# Patient Record
Sex: FEMALE | Race: WHITE | NOT HISPANIC OR LATINO | URBAN - METROPOLITAN AREA
[De-identification: names, ages, dates, MRNs, and addresses within clinical notes are randomized per-mention and may not be internally consistent; named-entity substitution may affect disease eponyms.]

---

## 2017-01-04 ENCOUNTER — TRANSFERRED RECORDS (OUTPATIENT)
Dept: HEALTH INFORMATION MANAGEMENT | Facility: CLINIC | Age: 57
End: 2017-01-04

## 2017-02-08 ENCOUNTER — TELEPHONE (OUTPATIENT)
Dept: FAMILY MEDICINE | Facility: CLINIC | Age: 57
End: 2017-02-08

## 2017-02-08 ENCOUNTER — TELEPHONE (OUTPATIENT)
Dept: INTERNAL MEDICINE | Facility: CLINIC | Age: 57
End: 2017-02-08

## 2017-02-08 NOTE — Clinical Note
[facility]  [address] [facility] [address] [phone]          February 8, 2017    Cheyanne Guardado  [address] [place] [address]    Dear Cheyanne,          Our records indicate that you have not scheduled for a(n)appointment with  Antwon Almazan MD and fasting bloodwork which was recommended by your health care team. Monitoring and managing your preventative and chronic health conditions are very important to us.       If you have received your health care elsewhere, please provide us with that information so it can be documented in your chart.    Please call [phone] or message us through your [employer] account to schedule an appointment or provide information for your chart.     I look forward to seeing you and working with you on your health care needs.     Sincerely,       Antwon Almazan MD/              *If you have already scheduled an appointment, please disregard this reminder

## 2017-02-08 NOTE — TELEPHONE ENCOUNTER
Panel Management Review      Patient has the following on her problem list:     Hypertension   Last three blood pressure readings:  BP Readings from Last 3 Encounters:   09/15/16 116/94   08/03/16 110/84   07/19/16 136/109     Blood pressure: Failed    HTN Guidelines:  Age 18-59 BP range:  Less than 140/90  Age 60-85 with Diabetes:  Less than 140/90  Age 60-85 without Diabetes:  less than 150/90      Composite cancer screening  Chart review shows that this patient is due/due soon for the following Colonoscopy  Summary:    Patient is due/failing the following:   BP, Hep C, COLONOSCOPY, Lipids, ASA and STATIN    Action needed:   Routed to provider for review.    Type of outreach:    None, routed to provider for review.    Questions for provider review:    Patient saw Karley in Sept for URI issues and abdominal issues in August. Looks like you are primary and refilled HCTZ in September 2016. Patient failed panel management due to Hypertension and there is no ASA or Statin addressed on Med list. Please advise.                                                                                                                                     Ria Crespo CMA       Chart routed to Provider .

## 2017-02-09 NOTE — TELEPHONE ENCOUNTER
Not sure what this is regarding. You had initiated a telephone encounter stating you needed to know the department and diagnosis. Did you receive a letter or fax asking for this?    Makenzie Farfan,

## 2017-02-13 NOTE — TELEPHONE ENCOUNTER
Per verbal order Dr. Antwon Almazan;  Ok external referral to [facility] for Bariatric surgery referral for consult for weight loss surgery.      :;  Please call patient and let her know bariatric consult referral in system and help patient obtain this referral.  Madelaine Kong RN  I agree with the above plan  Antwon Almazan MD

## 2017-02-13 NOTE — TELEPHONE ENCOUNTER
Dr. Antwon Almazan states had received a referral request from Cheyanne.  Please clarify what she needs or if she is still needing this.  He thinks it was for the [facility] but would need to know what department and what the diagnosis is for the referral.  Madelaine Kong RN

## 2017-02-13 NOTE — TELEPHONE ENCOUNTER
----- Message -----         From: Cheyanne Guardado        Sent: 2/7/2017   9:13 PM          To: An Referral Pool     Subject: Referral Request                                       Cheyanne Guardado would like to request a referral.     Reason: Inquiring about weight loss surgery      Requested provider: [facility] Shailesh     Comment:     Dr. Almazan please refer me to the [place]. My insurance does apply .I just need a referral from Primary DrTuyet       thank you

## 2017-04-13 ENCOUNTER — TELEPHONE (OUTPATIENT)
Dept: INTERNAL MEDICINE | Facility: CLINIC | Age: 57
End: 2017-04-13

## 2017-04-13 DIAGNOSIS — Z12.11 SCREENING FOR COLON CANCER: Primary | ICD-10-CM

## 2017-04-13 NOTE — TELEPHONE ENCOUNTER
Letter or MyChart message sent to remind patient of cancer screenings which are due. Please address orders and close encounter.    Ria Crespo CMA

## 2017-04-26 ENCOUNTER — OFFICE VISIT (OUTPATIENT)
Dept: PSYCHOLOGY | Facility: CLINIC | Age: 57
End: 2017-04-26

## 2017-04-26 DIAGNOSIS — E66.01 PSYCHOLOGICAL FACTORS AFFECTING MORBID OBESITY (H): ICD-10-CM

## 2017-04-26 DIAGNOSIS — F33.0 MAJOR DEPRESSIVE DISORDER, RECURRENT, MILD (H): Primary | ICD-10-CM

## 2017-04-26 DIAGNOSIS — F54 PSYCHOLOGICAL FACTORS AFFECTING MORBID OBESITY (H): ICD-10-CM

## 2017-04-26 DIAGNOSIS — F10.21 ALCOHOL DEPENDENCE IN SUSTAINED FULL REMISSION (H): ICD-10-CM

## 2017-04-26 NOTE — MR AVS SNAPSHOT
After Visit Summary   4/26/2017    Cheyanne Guardado    MRN: [id number]           Patient Information     Date Of Birth          1960        Visit Information        Provider Department      4/26/2017 4:00 PM Iza Padilla, PhD [facility]        Today's Diagnoses     Major depressive disorder, recurrent, mild (H)    -  1    Psychological factors affecting morbid obesity (H)           Follow-ups after your visit        Your next 10 appointments already scheduled     May 24, 2017  4:00 PM CDT   (Arrive by 3:45 PM)   Return Visit with Iza Padilla, PhD BRIAN   [facility] ([facility])    [address] [facility] [address]  [address] [facility] [address] [phone]              Who to contact     Please call your clinic at [phone] to:    Ask questions about your health    Make or cancel appointments    Discuss your medicines    Learn about your test results    Speak to your doctor   If you have compliments or concerns about an experience at your clinic, or if you wish to file a complaint, please contact [facility] Patient Relations at [phone] or email us at [email or web address]         Additional Information About Your Visit        MyChart Information     [employer] gives you secure access to your electronic health record. If you see a primary care provider, you can also send messages to your care team and make appointments. If you have questions, please call your primary care clinic.  If you do not have a primary care provider, please call [phone] and they will assist you.      [employer] is an electronic gateway that provides easy, online access to your medical records. With [employer], you can request a clinic appointment, read your test results, renew a prescription or communicate with your care team.     To access your existing account, please contact your [facility]  or call [phone] for assistance.        Care EveryWhere ID     This is your Care EveryWhere ID. This could be used by other organizations to access your [place] medical records  [id number]         Blood Pressure from Last 3 Encounters:   04/27/17 131/85   09/15/16 (!) 116/94   08/03/16 110/84    Weight from Last 3 Encounters:   04/27/17 92.9 kg (204 lb 12.8 oz)   09/15/16 89.4 kg (197 lb)   08/03/16 89.8 kg (198 lb)              Today, you had the following     No orders found for display       Primary Care Provider Office Phone # Fax #    Antwon Almazan [phone]       [facility] [address] [facility] [address]        Thank you!     Thank you for choosing [facility]  for your care. Our goal is always to provide you with excellent care. Hearing back from our patients is one way we can continue to improve our services. Please take a few minutes to complete the written survey that you may receive in the mail after your visit with us. Thank you!             Your Updated Medication List - Protect others around you: Learn how to safely use, store and throw away your medicines at www.disposemymeds.org.          This list is accurate as of: 4/26/17 11:59 PM.  Always use your most recent med list.                   Brand Name Dispense Instructions for use    clonazePAM 0.5 MG tablet    klonoPIN         estradiol 10 MCG Tabs vaginal tablet    VAGIFEM    30 tablet    Insert one vaginally nightly for 2 weeks, then reduce to three times weekly       hydrochlorothiazide 25 MG tablet    HYDRODIURIL    90 tablet    Take 1 tablet (25 mg) by mouth daily       omeprazole 40 MG capsule    priLOSEC    30 capsule    Take 1 capsule (40 mg) by mouth daily Take 30-60 minutes before a meal.       VYVANSE 70 MG capsule   Generic drug:  lisdexamfetamine      Take  by mouth every morning.       ZYRTEC ALLERGY PO      Take  by mouth.

## 2017-04-27 ENCOUNTER — OFFICE VISIT (OUTPATIENT)
Dept: FAMILY MEDICINE | Facility: CLINIC | Age: 57
End: 2017-04-27
Payer: COMMERCIAL

## 2017-04-27 VITALS
DIASTOLIC BLOOD PRESSURE: 85 MMHG | TEMPERATURE: 97.2 F | SYSTOLIC BLOOD PRESSURE: 131 MMHG | OXYGEN SATURATION: 100 % | BODY MASS INDEX: 41.36 KG/M2 | WEIGHT: 204.8 LBS | HEART RATE: 93 BPM

## 2017-04-27 DIAGNOSIS — R07.0 THROAT PAIN: Primary | ICD-10-CM

## 2017-04-27 DIAGNOSIS — I10 BENIGN ESSENTIAL HYPERTENSION: ICD-10-CM

## 2017-04-27 DIAGNOSIS — J35.8 TONSILLOLITH: ICD-10-CM

## 2017-04-27 LAB
DEPRECATED S PYO AG THROAT QL EIA: NORMAL
MICRO REPORT STATUS: NORMAL
SPECIMEN SOURCE: NORMAL

## 2017-04-27 PROCEDURE — 99213 OFFICE O/P EST LOW 20 MIN: CPT | Performed by: FAMILY MEDICINE

## 2017-04-27 PROCEDURE — 87081 CULTURE SCREEN ONLY: CPT | Performed by: FAMILY MEDICINE

## 2017-04-27 PROCEDURE — 87880 STREP A ASSAY W/OPTIC: CPT | Performed by: FAMILY MEDICINE

## 2017-04-27 NOTE — NURSING NOTE
"Chief Complaint   Patient presents with     Pharyngitis       Initial /85  Pulse 93  Temp 97.2  F (36.2  C) (Oral)  Wt 204 lb 12.8 oz (92.9 kg)  SpO2 100%  BMI 41.36 kg/m2 Estimated body mass index is 41.36 kg/(m^2) as calculated from the following:    Height as of 9/15/16: 4' 11\" (1.499 m).    Weight as of this encounter: 204 lb 12.8 oz (92.9 kg).  Medication Reconciliation: complete  Jessie Whitfield CMA    "

## 2017-04-27 NOTE — PROGRESS NOTES
SUBJECTIVE:                                                    Cheyanne Guardado is a 57 year old female who presents to clinic today for the following health issues:      Sore Throat      Duration: 2-3 days    Description  Sore throat, white spot noticed in the back of the throat    Severity: MILD    Accompanying signs and symptoms: None    History (predisposing factors):  strep exposure at work    Precipitating or alleviating factors: None    Therapies tried and outcome:  none     Going around at work.   Just noticed last night that her tongue was sore and noticed a white spot    fever  - No  cough  -No  ill contacts - Yes  able to swallow liquids and solids -YES  other symptoms above  Rash: No  Has tried over the counter medications no relief  because of persistence, patient came in to be seen.    ROS:  denies any exertional chest pain or shortness of breath  denies any unusual rash or joint swelling  denies post-tussive emesis or pertussis like symptoms  Negative for constitutional, eye, ear, nose, throat, skin, respiratory, cardiac, and gastrointestinal other than those outlined in the HPI.    PMH: chart reviewed  FH: chart reviewed    SH: chart reviewed and as above   Physical Exam:   /85  Pulse 93  Temp 97.2  F (36.2  C) (Oral)  Wt 204 lb 12.8 oz (92.9 kg)  SpO2 100%  BMI 41.36 kg/m2  General : Awake Alert not in any acute cardiorespiratory distress  Head:       Normocephalic Atraumatic  Eyes:    Pupils equally reactive to light and accomodation. Sclera not icteric.   ENT:   midline nasal septum, mild nasal congestion, sinuses non-tender  left ear: no tragal tenderness, no mastoid tenderness, normal EAC, normal TM  right ear: left ear: no tragal tenderness, no mastoid tenderness, normal EAC, normal TM  mouth moist buccal mucosa, No hyperemic posterior pharyngeal wall, no trismus  tonsils: right tonsil abnormal with white spot, possible tonsil stone  anterior cervical nodes: No tender  posterior  cervical nodes: No  palpable  Heart:  Regular in rate and rhythm, no murmurs rubs or gallops  Lungs: Symmetrical Chest Expansion, no retractions, clear breath sounds  Abdomen: soft, no hepatosplenomegally  Psych: Appropriate mood and affect. Pleasant  Skin: patient undressed to level of his/her comfort. No visible concerning lesions.    Labs: Strep negative     ICD-10-CM    1. Throat pain R07.0 Rapid strep screen     Beta strep group A culture   2. Tonsillolith J35.8    3. Benign essential hypertension I10      Patient was more worried about strep with the white spot.   Her throat discOMFORT is actually pretty mild. I believe the white spot appears more consistent with a tonsil stone. Treatment  Of tonsil stones discussed.   Discussed antibiotic treatment , on discussion of risks vs benefits, we would like to hold off for now.  supportive treatment: advised supportive treatment, Advised to come back in if with any worsening symptoms or if not better despite supportive measures. Especially if with any worsening sore throat, inability to eat or drink or swallow, or trismus. Symptoms of peritonsillar abscess discussed. Patient voiced understanding.  adverse reactions of medication discussed  OTC medications discussed  advised to come back in right away if with any worsening symptoms or if with no relief despite treatment plan  patient voiced understanding and had no further questions at this time.    BP at goal, follow up with primary care provider

## 2017-04-27 NOTE — MR AVS SNAPSHOT
After Visit Summary   4/27/2017    Cheyanne Guardado    MRN: [id number]           Patient Information     Date Of Birth          1960        Visit Information        Provider Department      4/27/2017 11:20 AM Tricia Emanuel MD [facility]        Today's Diagnoses     Throat pain    -  1    Tonsillolith          Care Instructions    Possible tonsil stone.         Follow-ups after your visit        Who to contact     If you have questions or need follow up information about today's clinic visit or your schedule please contact [facility] directly at [phone].  Normal or non-critical lab and imaging results will be communicated to you by Get[employer], letter or phone within 4 business days after the clinic has received the results. If you do not hear from us within 7 days, please contact the clinic through [employer] or phone. If you have a critical or abnormal lab result, we will notify you by phone as soon as possible.  Submit refill requests through [employer] or call your pharmacy and they will forward the refill request to us. Please allow 3 business days for your refill to be completed.          Additional Information About Your Visit        MyChart Information     [employer] gives you secure access to your electronic health record. If you see a primary care provider, you can also send messages to your care team and make appointments. If you have questions, please call your primary care clinic.  If you do not have a primary care provider, please call [phone] and they will assist you.        Care EveryWhere ID     This is your Care EveryWhere ID. This could be used by other organizations to access your [place] medical records  [id number]        Your Vitals Were     Pulse Temperature Pulse Oximetry BMI (Body Mass Index)          93 97.2  F (36.2  C) (Oral) 100% 41.36 kg/m2         Blood Pressure from Last 3 Encounters:   04/27/17 131/85   09/15/16 (!) 116/94    08/03/16 110/84    Weight from Last 3 Encounters:   04/27/17 204 lb 12.8 oz (92.9 kg)   09/15/16 197 lb (89.4 kg)   08/03/16 198 lb (89.8 kg)              We Performed the Following     Beta strep group A culture     Rapid strep screen        Primary Care Provider Office Phone # Fax #    Antwon Almazan [phone]       [facility] [address] [facility] [address]        Thank you!     Thank you for choosing [facility]  for your care. Our goal is always to provide you with excellent care. Hearing back from our patients is one way we can continue to improve our services. Please take a few minutes to complete the written survey that you may receive in the mail after your visit with us. Thank you!             Your Updated Medication List - Protect others around you: Learn how to safely use, store and throw away your medicines at www.disposemymeds.org.          This list is accurate as of: 4/27/17 12:03 PM.  Always use your most recent med list.                   Brand Name Dispense Instructions for use    clonazePAM 0.5 MG tablet    klonoPIN         estradiol 10 MCG Tabs vaginal tablet    VAGIFEM    30 tablet    Insert one vaginally nightly for 2 weeks, then reduce to three times weekly       hydrochlorothiazide 25 MG tablet    HYDRODIURIL    90 tablet    Take 1 tablet (25 mg) by mouth daily       LOSARTAN POTASSIUM PO      Take by mouth daily       omeprazole 40 MG capsule    priLOSEC    30 capsule    Take 1 capsule (40 mg) by mouth daily Take 30-60 minutes before a meal.       PROZAC PO      Take 40 mg by mouth daily       VYVANSE 70 MG capsule   Generic drug:  lisdexamfetamine      Take  by mouth every morning.       ZYRTEC ALLERGY PO      Take  by mouth.

## 2017-04-28 LAB
BACTERIA SPEC CULT: NORMAL
MICRO REPORT STATUS: NORMAL
SPECIMEN SOURCE: NORMAL

## 2017-05-03 ENCOUNTER — OFFICE VISIT (OUTPATIENT)
Dept: PSYCHOLOGY | Facility: CLINIC | Age: 57
End: 2017-05-03

## 2017-05-03 DIAGNOSIS — F10.21 MODERATE ALCOHOL USE DISORDER, IN SUSTAINED REMISSION (H): ICD-10-CM

## 2017-05-03 DIAGNOSIS — F41.9 ANXIETY: Primary | ICD-10-CM

## 2017-05-03 NOTE — MR AVS SNAPSHOT
After Visit Summary   5/3/2017    Cheyanne Guardado    MRN: [id number]           Patient Information     Date Of Birth          1960        Visit Information        Provider Department      5/3/2017 4:00 PM Iza Padilla, PhD [facility]        Today's Diagnoses     Anxiety    -  1    Moderate alcohol use disorder, in sustained remission (H)           Follow-ups after your visit        Who to contact     Please call your clinic at [phone] to:    Ask questions about your health    Make or cancel appointments    Discuss your medicines    Learn about your test results    Speak to your doctor   If you have compliments or concerns about an experience at your clinic, or if you wish to file a complaint, please contact [facility] Patient Relations at [phone] or email us at [email or web address]         Additional Information About Your Visit        MyChart Information     [employer] gives you secure access to your electronic health record. If you see a primary care provider, you can also send messages to your care team and make appointments. If you have questions, please call your primary care clinic.  If you do not have a primary care provider, please call [phone] and they will assist you.      [employer] is an electronic gateway that provides easy, online access to your medical records. With [facility], you can request a clinic appointment, read your test results, renew a prescription or communicate with your care team.     To access your existing account, please contact your [facility] or call [phone] for assistance.        Care EveryWhere ID     This is your Care EveryWhere ID. This could be used by other organizations to access your [place] medical records  [id number]         Blood Pressure from Last 3 Encounters:   04/27/17 131/85   09/15/16 (!) 116/94   08/03/16 110/84    Weight from Last 3  Encounters:   04/27/17 92.9 kg (204 lb 12.8 oz)   09/15/16 89.4 kg (197 lb)   08/03/16 89.8 kg (198 lb)              Today, you had the following     No orders found for display       Primary Care Provider Office Phone # Fax #    Antwon Almazan [phone]       [facility] [address] [facility] [address]        Thank you!     Thank you for choosing [facility]  for your care. Our goal is always to provide you with excellent care. Hearing back from our patients is one way we can continue to improve our services. Please take a few minutes to complete the written survey that you may receive in the mail after your visit with us. Thank you!             Your Updated Medication List - Protect others around you: Learn how to safely use, store and throw away your medicines at www.disposemymeds.org.          This list is accurate as of: 5/3/17 11:59 PM.  Always use your most recent med list.                   Brand Name Dispense Instructions for use    clonazePAM 0.5 MG tablet    klonoPIN         estradiol 10 MCG Tabs vaginal tablet    VAGIFEM    30 tablet    Insert one vaginally nightly for 2 weeks, then reduce to three times weekly       hydrochlorothiazide 25 MG tablet    HYDRODIURIL    90 tablet    Take 1 tablet (25 mg) by mouth daily       LOSARTAN POTASSIUM PO      Take by mouth daily       omeprazole 40 MG capsule    priLOSEC    30 capsule    Take 1 capsule (40 mg) by mouth daily Take 30-60 minutes before a meal.       PROZAC PO      Take 40 mg by mouth daily       VYVANSE 70 MG capsule   Generic drug:  lisdexamfetamine      Take  by mouth every morning.       ZYRTEC ALLERGY PO      Take  by mouth.
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
## 2017-05-17 ENCOUNTER — OFFICE VISIT (OUTPATIENT)
Dept: PSYCHOLOGY | Facility: CLINIC | Age: 57
End: 2017-05-17

## 2017-05-17 DIAGNOSIS — E66.01 PSYCHOLOGICAL FACTORS AFFECTING MORBID OBESITY (H): ICD-10-CM

## 2017-05-17 DIAGNOSIS — F33.0 MAJOR DEPRESSIVE DISORDER, RECURRENT EPISODE, MILD (H): Primary | ICD-10-CM

## 2017-05-17 DIAGNOSIS — F54 PSYCHOLOGICAL FACTORS AFFECTING MORBID OBESITY (H): ICD-10-CM

## 2017-05-17 DIAGNOSIS — F10.21 ALCOHOL DEPENDENCE IN SUSTAINED FULL REMISSION (H): ICD-10-CM

## 2017-05-24 ENCOUNTER — OFFICE VISIT (OUTPATIENT)
Dept: PSYCHOLOGY | Facility: CLINIC | Age: 57
End: 2017-05-24

## 2017-05-24 DIAGNOSIS — F10.21 ALCOHOL DEPENDENCE IN SUSTAINED FULL REMISSION (H): Primary | ICD-10-CM

## 2017-05-24 DIAGNOSIS — F54 PSYCHOLOGICAL FACTORS AFFECTING MORBID OBESITY (H): ICD-10-CM

## 2017-05-24 DIAGNOSIS — E66.01 PSYCHOLOGICAL FACTORS AFFECTING MORBID OBESITY (H): ICD-10-CM

## 2017-05-24 DIAGNOSIS — F33.0 MAJOR DEPRESSIVE DISORDER, RECURRENT EPISODE, MILD (H): ICD-10-CM

## 2017-05-24 NOTE — MR AVS SNAPSHOT
After Visit Summary   5/24/2017    Cheyanne Guardado    MRN: [id number]           Patient Information     Date Of Birth          1960        Visit Information        Provider Department      5/24/2017 4:00 PM Iza Padilla, PhD [facility]        Today's Diagnoses     Alcohol dependence in sustained full remission (H)    -  1    Major depressive disorder, recurrent episode, mild (H)        Psychological factors affecting morbid obesity (H)           Follow-ups after your visit        Who to contact     Please call your clinic at [phone] to:    Ask questions about your health    Make or cancel appointments    Discuss your medicines    Learn about your test results    Speak to your doctor   If you have compliments or concerns about an experience at your clinic, or if you wish to file a complaint, please contact [facility] Patient Relations at [phone] or email us at [email or web address]         Additional Information About Your Visit        MyChart Information     [employer] gives you secure access to your electronic health record. If you see a primary care provider, you can also send messages to your care team and make appointments. If you have questions, please call your primary care clinic.  If you do not have a primary care provider, please call [phone] and they will assist you.      [employer] is an electronic gateway that provides easy, online access to your medical records. With [employer], you can request a clinic appointment, read your test results, renew a prescription or communicate with your care team.     To access your existing account, please contact your [facility] or call [phone] for assistance.        Care EveryWhere ID     This is your Care EveryWhere ID. This could be used by other organizations to access your [facility] medical records  [id number]         Blood Pressure from Last 3  Encounters:   04/27/17 131/85   09/15/16 (!) 116/94   08/03/16 110/84    Weight from Last 3 Encounters:   04/27/17 92.9 kg (204 lb 12.8 oz)   09/15/16 89.4 kg (197 lb)   08/03/16 89.8 kg (198 lb)              Today, you had the following     No orders found for display       Primary Care Provider Office Phone # Fax #    Antwon Almazan [phone]       [facility] [address] [facility] [address]        Thank you!     Thank you for choosing [facility]  for your care. Our goal is always to provide you with excellent care. Hearing back from our patients is one way we can continue to improve our services. Please take a few minutes to complete the written survey that you may receive in the mail after your visit with us. Thank you!             Your Updated Medication List - Protect others around you: Learn how to safely use, store and throw away your medicines at www.disposemymeds.org.          This list is accurate as of: 5/24/17 11:59 PM.  Always use your most recent med list.                   Brand Name Dispense Instructions for use    clonazePAM 0.5 MG tablet    klonoPIN         estradiol 10 MCG Tabs vaginal tablet    VAGIFEM    30 tablet    Insert one vaginally nightly for 2 weeks, then reduce to three times weekly       hydrochlorothiazide 25 MG tablet    HYDRODIURIL    90 tablet    Take 1 tablet (25 mg) by mouth daily       LOSARTAN POTASSIUM PO      Take by mouth daily       omeprazole 40 MG capsule    priLOSEC    30 capsule    Take 1 capsule (40 mg) by mouth daily Take 30-60 minutes before a meal.       PROZAC PO      Take 40 mg by mouth daily       VYVANSE 70 MG capsule   Generic drug:  lisdexamfetamine      Take  by mouth every morning.       ZYRTEC ALLERGY PO      Take  by mouth.

## 2017-05-25 NOTE — PROGRESS NOTES
"Health Psychology                                      Department of Medicine                                           [facility] Mail Code 741    Michelle Booker, Ph.D., L.P. [phone]  [address] [place] [address], [facility] Augusto Romero, Ph.D.,  L.P. [phone]  [place] [address]  Deon Pinzon, Ph.D., A.B.P.P., L.P. [phone]   ________________________________________________________________________________________________  Health Psychology - Follow up Visit  Confidential Summary*    REFERRAL SOURCE  [facility] bariatric surgery    CHIEF COMPLAINT/REASON FOR VISIT  Cognitive behavioral therapy and behavioral counseling in context of health and behavior issues related to weight loss and upcoming weight loss surgery/gastric sleeve surgery.       Patient was seen today for a 60 minute individual health and behavior intervention session.    Subjective:  Patient began with report that she used the [employer] pal francy and we reviewed her dietary intake for the week. She is averaging 6867-8216 calories per day and on two days, she reported intake less than 1000.  Her weight today is 206, a weight loss of approximately 4 pounds over the past 4 weeks.  However, the patient reported that she has only been \"trying\" for the past two weeks.  Congratulated her effort.  She also reported increasing her activity but reported that she has noticed that she is too tired in the evenings to get activity.  Discussed incorporating activity into her routine and to attempt to stop on way home from work versus requiring herself to leave home again.  She reported that she and her  have a routine in which he stops at the gym on the way home and she makes dinner and they eat in front of the tv in the basement.  Described concern that she and her  have grown distant in their communication. She described observation that she does not have any recreational " activities.      Objective:  Patient was on time for today s session, appropriately groomed and dressed, and demonstrated good eye contact.  She appeared friendly, alert and oriented.  Mood was euthymic, with appropriate range of affect. Patient denied suicidal or assaultive ideation, plan, or intent.        Assessment:  The patient has a longstanding history of obesity and continues to report current main motivation for weight loss surgery as physical health concerns and her desire to be more physically active and able to interact with her upcoming grandchild.  She is continuing to prepare for weight loss surgery through education, working to improve portion control, and considering ways to increase her activity level and to increase recreational activities.  Patient does have a history of depression associated with chronic family discord, but these symptoms appear well controlled at this point and was not addressed during today's session. Focus today on health and behavior related issues in context of weight loss. Expectation for surgery appear realistic, with fairly good understandings and appropriate level of questions and concerns. Through cognitive behavioral therapy and behavioral counseling discussed aspects of health and behavior related concepts including sleep, nutrition, physical exercise and plans/xpectations pre- and post- surgery.    Plan:  Patient will be seen in 1 week.      Time In:  4:00  Time Out:  5:00    Diagnosis:  Axis I Mdd, mild; psych factors impacting health, substance dependence in full sustained remission   Axis II Deferred   Axis III Obesity (278.00), please see medical records for details   [place] IV Psychosocial and Environmental Stressors: Health & family struggles       Iza Padilla, Ph.D., L.P.      *In accordance with the Rules of the [place] Board of Psychology, it is noted that psychological descriptions and scientific procedures underlying psychological evaluations have  limitations.  Absolute predictions cannot be made based on information in this report.

## 2017-05-26 PROBLEM — F10.21 ALCOHOL DEPENDENCE IN SUSTAINED FULL REMISSION (H): Status: ACTIVE | Noted: 2017-05-26

## 2017-05-26 PROBLEM — E66.01 PSYCHOLOGICAL FACTORS AFFECTING MORBID OBESITY (H): Status: ACTIVE | Noted: 2017-05-26

## 2017-05-26 PROBLEM — F54 PSYCHOLOGICAL FACTORS AFFECTING MORBID OBESITY (H): Status: ACTIVE | Noted: 2017-05-26

## 2017-05-26 PROBLEM — F33.0 MAJOR DEPRESSIVE DISORDER, RECURRENT EPISODE, MILD (H): Status: ACTIVE | Noted: 2017-05-26

## 2017-05-26 NOTE — PROGRESS NOTES
Health Psychology                                      Department of Medicine                                           [facility] Mail Code 741    Michelle Booker, Ph.D., L.P. [phone]  [address] [place] [address], [facility] Augusto Romero, Ph.D.,  L.P. [phone]  [place] [address]  Deon Pinzon, Ph.D., A.B.P.P., L.P. [phone]   ________________________________________________________________________________________________  Health Psychology - Follow up Visit  Confidential Summary*    REFERRAL SOURCE  [facility] bariatric surgery    CHIEF COMPLAINT/REASON FOR VISIT  Cognitive behavioral therapy and behavioral counseling in context of health and behavior issues related to weight loss and upcoming weight loss surgery/gastric sleeve surgery.       Patient was seen today for a 60 minute individual health and behavior intervention session.    Subjective:  Patients began with report that she knows what she needs to do to lose weight but has been unable to resist overeating during the evening hours.  She described concern surrounding her ability to engage in physical activity on her own, without a partner.  Discussed her friendship network and it appears that she is largely isolated with her , work and children and their myriad activities.  Encouraged her to consider developing or reconnecting with friends who might support her in this journey.  She voiced some concern that her  is a workaholic and they do not have sufficient time to have recreational activities together.      She continues to attend her cd recovery doctor visits in [place] each week and to attend one AA meeting.  She has missed a separate meeting with her new sponsor.  Patient voiced some overwhelm that her schedule does not allow for much fun between everything.   She is an avid  and has multiple gardens to prepare.      Objective:  Patient was on time for today s  session, appropriately groomed and dressed, and demonstrated good eye contact.  She appeared friendly, alert and oriented.  Mood was euthymic, with appropriate range of affect. Patient denied suicidal or assaultive ideation, plan, or intent.        Assessment:  The patient has a longstanding history of obesity and continues to report current main motivation for weight loss surgery as physical health concerns and her desire to be more physically active and able to interact with her upcoming grandchild.  She is continuing to prepare for weight loss surgery through education, working to improve portion control, and considering ways to increase her activity level and to increase recreational activities.      Plan:  Patient will be seen in 1 week.      Time In:  4:00  Time Out:  5:00    Diagnosis:  Axis I Mdd, mild; psych factors impacting health, substance dependence in full sustained remission   Axis II Deferred   Axis III Obesity (278.00), please see medical records for details   [place] IV Psychosocial and Environmental Stressors: Health & family struggles       Iza Padilla, Ph.D., L.P.      *In accordance with the Rules of the [place] Board of Psychology, it is noted that psychological descriptions and scientific procedures underlying psychological evaluations have limitations.  Absolute predictions cannot be made based on information in this report.

## 2017-06-01 NOTE — PROGRESS NOTES
Health Psychology                                      Department of Medicine                                           [facility] Mail Code 741    Michelle Booker, Ph.D., L.P. [phone]  [address] [place] [address], [facility]is[facility]uyet Augusto Romero, Ph.D.,  L.P. [phone]  [place] [address]  Deon Pinzon, Ph.D., A.B.P.P., L.P. [phone]   ________________________________________________________________________________________________  Health Psychology - Follow up Visit  Confidential Summary*    REFERRAL SOURCE  [facility] bariatric surgery    CHIEF COMPLAINT/REASON FOR VISIT  Cognitive behavioral therapy and behavioral counseling in context of health and behavior issues related to weight loss and upcoming weight loss surgery/gastric sleeve surgery.       Patient was seen today for a 60 minute individual health and behavior intervention session.    Subjective: Began with report that she is at 200 pounds.  Discussed decreasing daily goal to 1500 for loss of 1 pound per week.  Affirmed research that the more weight that can be lost before surgery, the better overll weight loss maintenance since she will learn skills that she will be required to use long term for maintenance.  She reported that she is making an effort to make better choices by eating dinner at home and attempting to balance her diet with protein, begetables and starch. She reported that it is most difficult to control her intake while at work.  She reported attempts to bring a healthy lunch and snacks to work to improve her resistance to junk foods.  She continues to describe an awareness of emotional eating, mainly to manage feelings of boredom and some anxiety surrounding family of origin strife.    Objective:  Patient was on time for today s session, appropriately groomed and dressed, and demonstrated good eye contact.  She appeared friendly, alert and oriented.  Mood was euthymic, with  appropriate range of affect. Patient denied suicidal or assaultive ideation, plan, or intent.        Assessment:  The patient has a longstanding history of obesity and continues to report current main motivation for weight loss surgery as physical health concerns and her desire to be more physically active and able to interact with her upcoming grandchild.  She is continuing to prepare for weight loss surgery through education, working to improve portion control, and considering ways to increase her activity level and to increase recreational activities.  Patient does have a history of depressionand alcohol dependence  associated with chronic family discord, but these symptoms appear well controlled at this point with AA and ongoing treatment with a mental health practitioner at [place].      Focus today on health and behavior related issues in context of weight loss. Expectation for surgery appear realistic, with fairly good understandings and appropriate level of questions and concerns. Through cognitive behavioral therapy and behavioral counseling discussed aspects of health and behavior related concepts including sleep, nutrition, physical exercise and plans/xpectations pre- and post- surgery.    Plan:  Patient will be seen in 1 week.      Time In:  4:00  Time Out:  5:00    Diagnosis:  Axis I Mdd, mild; psych factors impacting health, substance dependence in full sustained remission   Axis II Deferred   Axis III Obesity (278.00), please see medical records for details   [place] IV Psychosocial and Environmental Stressors: Health & family struggles       Iza Padilla, Ph.D., L.P.      *In accordance with the Rules of the [place] Board of [employer], it is noted that psychological descriptions and scientific procedures underlying psychological evaluations have limitations.  Absolute predictions cannot be made based on information in this report.

## 2017-06-05 ENCOUNTER — OFFICE VISIT (OUTPATIENT)
Dept: INTERNAL MEDICINE | Facility: CLINIC | Age: 57
End: 2017-06-05
Payer: COMMERCIAL

## 2017-06-05 VITALS
OXYGEN SATURATION: 98 % | HEART RATE: 94 BPM | SYSTOLIC BLOOD PRESSURE: 128 MMHG | DIASTOLIC BLOOD PRESSURE: 98 MMHG | WEIGHT: 200 LBS | TEMPERATURE: 97.9 F | BODY MASS INDEX: 40.4 KG/M2

## 2017-06-05 DIAGNOSIS — J20.9 ACUTE BRONCHITIS, UNSPECIFIED ORGANISM: Primary | ICD-10-CM

## 2017-06-05 DIAGNOSIS — K21.9 GASTROESOPHAGEAL REFLUX DISEASE, ESOPHAGITIS PRESENCE NOT SPECIFIED: ICD-10-CM

## 2017-06-05 PROCEDURE — 99214 OFFICE O/P EST MOD 30 MIN: CPT | Performed by: INTERNAL MEDICINE

## 2017-06-05 NOTE — PATIENT INSTRUCTIONS
"Please take the antibiotics and let us know if you are not better after 3 complete days on it.   Please also try to use over the counter Zantac 300mg at night, consistently for the next 2-4 weeks-of you have another episode of nausea/vomiting, or if you feeling of something being stuck in the throat when you swallow persists, let us know and we can consider ordering an upper endoscopy (\"EGD\") at that time.   Please use the CPAP consistently.     "

## 2017-06-05 NOTE — MR AVS SNAPSHOT
"              After Visit Summary   6/5/2017    Cheyanne Guardado    MRN: [id number]           Patient Information     Date Of Birth          1960        Visit Information        Provider Department      6/5/2017 10:50 AM Aynana Crandall MD [facility]        Today's Diagnoses     Acute bronchitis, unspecified organism    -  1      Care Instructions    Please take the antibiotics and let us know if you are not better after 3 complete days on it.   Please also try to use over the counter Zantac 300mg at night, consistently for the next 2-4 weeks-of you have another episode of nausea/vomiting, or if you feeling of something being stuck in the throat when you swallow persists, let us know and we can consider ordering an upper endoscopy (\"EGD\") at that time.   Please use the CPAP consistently.             Follow-ups after your visit        Your next 10 appointments already scheduled     Jun 07, 2017  4:00 PM CDT   (Arrive by 3:45 PM)   Return Visit with Iza Padilla, PhD [facility] ([facility])    [address] [place] [id number] [phone]              Who to contact     If you have questions or need follow up information about today's clinic visit or your schedule please contact [facility] directly at [phone].  Normal or non-critical lab and imaging results will be communicated to you by MyChart, letter or phone within 4 business days after the clinic has received the results. If you do not hear from us within 7 days, please contact the clinic through MyChart or phone. If you have a critical or abnormal lab result, we will notify you by phone as soon as possible.  Submit refill requests through [employer] or call your pharmacy and they will forward the refill request to us. Please allow 3 business days for your refill to be completed.          Additional Information About Your Visit      "   [employer]plex Information     [employer] gives you secure access to your electronic health record. If you see a primary care provider, you can also send messages to your care team and make appointments. If you have questions, please call your primary care clinic.  If you do not have a primary care provider, please call [phone] and they will assist you.        Care EveryWhere ID     This is your Care EveryWhere ID. This could be used by other organizations to access your [place] medical records  [id number]        Your Vitals Were     Pulse Temperature Pulse Oximetry BMI (Body Mass Index)          94 97.9  F (36.6  C) (Oral) 98% 40.4 kg/m2         Blood Pressure from Last 3 Encounters:   06/05/17 (!) 128/98   04/27/17 131/85   09/15/16 (!) 116/94    Weight from Last 3 Encounters:   06/05/17 200 lb (90.7 kg)   04/27/17 204 lb 12.8 oz (92.9 kg)   09/15/16 197 lb (89.4 kg)              Today, you had the following     No orders found for display         Today's Medication Changes          These changes are accurate as of: 6/5/17 12:08 PM.  If you have any questions, ask your nurse or doctor.               Start taking these medicines.        Dose/Directions    amoxicillin-clavulanate 875-125 MG per tablet   Commonly known as:  AUGMENTIN   Used for:  Acute bronchitis, unspecified organism   Started by:  Ayanna Crandall MD        Dose:  1 tablet   Take 1 tablet by mouth 2 times daily   Quantity:  20 tablet   Refills:  0         Stop taking these medicines if you haven't already. Please contact your care team if you have questions.     VYVANSE 70 MG capsule   Generic drug:  lisdexamfetamine   Stopped by:  Ayanna Crandall MD                Where to get your medicines      These medications were sent to [facility] [address] [facility] [address]  [address] [address]     Phone:  [phone]     amoxicillin-clavulanate 875-125 MG per tablet                 Primary Care Provider Office Phone # Fax #    Antwon Almazan [phone]       [facility] [place] [address] [facility] [address]        Thank you!     Thank you for choosing [facility]  for your care. Our goal is always to provide you with excellent care. Hearing back from our patients is one way we can continue to improve our services. Please take a few minutes to complete the written survey that you may receive in the mail after your visit with us. Thank you!             Your Updated Medication List - Protect others around you: Learn how to safely use, store and throw away your medicines at www.disposemymeds.org.          This list is accurate as of: 6/5/17 12:08 PM.  Always use your most recent med list.                   Brand Name Dispense Instructions for use    amoxicillin-clavulanate 875-125 MG per tablet    AUGMENTIN    20 tablet    Take 1 tablet by mouth 2 times daily       clonazePAM 0.5 MG tablet    klonoPIN         estradiol 10 MCG Tabs vaginal tablet    VAGIFEM    30 tablet    Insert one vaginally nightly for 2 weeks, then reduce to three times weekly       hydrochlorothiazide 25 MG tablet    HYDRODIURIL    90 tablet    Take 1 tablet (25 mg) by mouth daily       LOSARTAN POTASSIUM PO      Take by mouth daily       MODAFINIL PO      Take 100 mg by mouth daily       PROZAC PO      Take 40 mg by mouth daily       ZYRTEC ALLERGY PO      Take  by mouth.

## 2017-06-05 NOTE — PROGRESS NOTES
SUBJECTIVE:                                                    Cheyanne Guardado is a 57 year old female who presents to clinic today for the following health issues:      ENT Symptoms             Symptoms: cc Present Absent Comment   Fever/Chills   x    Fatigue  x     Muscle Aches   x    Eye Irritation   x    Sneezing   x    Nasal Anatoly/Drg   x    Sinus Pressure/Pain  x     Loss of smell   x    Dental pain   x    Sore Throat   x    Swollen Glands   x    Ear Pain/Fullness  x     Cough  x  cough productive of yellow sputum    Wheeze  x     Chest Pain  x  heaviness   Shortness of breath  x     Rash   x    Other         Symptom duration: Started Friday    Symptom severity:  moderate   Treatments tried:  robitussin   Contacts:  work     Patient is often tired on Modafinil from her Psychiatrist, reports recent dx of LYN now on CPAP.  Please see my 9/2016 for more background on this patient's PMH. Since then, she was seen at the [facility] and was diagnosed with LYN and started on CPAP-her throat tightness/fullness and hoarse voice after staritng CPAP though she has not used it as consistently recently.  She is off of her PPI and only uses prn Zantac for infrequent GERD symptoms.  Also has Zyrtec daily for allergy symptoms.   She has had a cough productive of yellow sputum for the past few days.   Asthma ruled out at the [facility] (so not anymore on [place]).  Also, has had episodes of nausea with vomiting and diarrhea- has had 2 episodes-in the past few months, not in the past month.  No noted food triggers, or anxiety.    Has never had an EGD.   She is seeing a [place] physician for other issues, is considering bariatric surgery.   History of ETOH use-no recent choking episodes.     Given the patient's history and clinical presentation, she is not considered high risk for acute measles.     Gastrointestinal symptoms      Duration: acid reflux    Description:           REFLUX SYMPTOMS - heartburn, regurgitation of food,  nausea, vomiting, cough and chest pain    Intensity:  moderate    Accompanying signs and symptoms:  nausea, vomiting, chills, diarrhea, loose stools and bloating    History  Previous similar problem: YES  Previous evaluation:  colonoscopy, CT abdomen and barium enema    Aggravating factors: fatty meals, caffeine and lying down    Alleviating factors: low fat diet and avoidance of caffeine    Other Therapies tried: noticed some relief from diet and caffeine avoidance        Has had episodes of nausea with vomiting and diarrhea- has had 2 episodes-in the past few months, not in the past month.  No noted food triggers, or anxiety.    Has never had an EGD.   She is seeing a [place] physician for other issues, is considering bariatric surgery.   History of ETOH use-no recent choking episodes.   No report of weight loss.  She does at times have the feeling of something being stuck in her throat when she swallows.  She takes Zantac as 150mg, but not consistently.   No report of the yellowing of the skin.  History of carcinoid. Sometimes feels flushed when she has her current Gi issues.     Problem list and histories reviewed & adjusted, as indicated.  Additional history: as documented    Patient Active Problem List   Diagnosis     HTN (hypertension)     CARDIOVASCULAR SCREENING; LDL GOAL LESS THAN 160     Irritable bowel disease     Carcinoid tumor     Cervical dysplasia     Anxiety     Advanced directives, counseling/discussion     Benign essential hypertension     Essential hypertension     Hypertension goal BP (blood pressure) < 140/90     Major depressive disorder, recurrent episode, mild (H)     Alcohol dependence in sustained full remission (H)     Psychological factors affecting morbid obesity (H)     Past Surgical History:   Procedure Laterality Date     C  DELIVERY ONLY      x3     HC HYSTEROSCOPY, SURGICAL; W/ ENDOMETRIAL ABLATION, ANY METHOD      10 years ago      SURGICAL HISTORY OF -       right lung  removal     SURGICAL HISTORY OF -       ECT     TUBAL LIGATION         Social History   Substance Use Topics     Smoking status: Former Smoker     Packs/day: 1.50     Years: 20.00     Types: Cigarettes     Quit date: 4/2/1998     Smokeless tobacco: Never Used     Alcohol use No     Family History   Problem Relation Age of Onset     Hypertension Mother      HEART DISEASE Mother      endometrial cancer      CANCER Maternal Grandmother      renal/skin     Asthma Brother      Eczema Brother      Asthma Son      Eczema Father      Alzheimer Disease Father      Thyroid Disease Sister      Thyroid Disease Sister          Current Outpatient Prescriptions   Medication Sig Dispense Refill     MODAFINIL PO Take 100 mg by mouth daily       amoxicillin-clavulanate (AUGMENTIN) 875-125 MG per tablet Take 1 tablet by mouth 2 times daily 20 tablet 0     FLUoxetine HCl (PROZAC PO) Take 40 mg by mouth daily       LOSARTAN POTASSIUM PO Take by mouth daily       hydrochlorothiazide (HYDRODIURIL) 25 MG tablet Take 1 tablet (25 mg) by mouth daily 90 tablet 0     clonazePAM (KLONOPIN) 0.5 MG tablet   2     estradiol (VAGIFEM) 10 MCG TABS Insert one vaginally nightly for 2 weeks, then reduce to three times weekly 30 tablet 4     Cetirizine HCl (ZYRTEC ALLERGY PO) Take  by mouth.         Reviewed and updated as needed this visit by clinical staff  Tobacco  Allergies  Meds       Reviewed and updated as needed this visit by Provider         ==============================================================  ROS:  Constitutional, HEENT, cardiovascular, pulmonary, GI, , musculoskeletal, neuro, skin, endocrine and psych systems are negative, except as otherwise noted.     OBJECTIVE:                                                    BP (!) 128/98  Pulse 94  Temp 97.9  F (36.6  C) (Oral)  Wt 200 lb (90.7 kg)  SpO2 98%  BMI 40.4 kg/m2  Body mass index is 40.4 kg/(m^2).     GENERAL APPEARANCE: healthy, alert and in no distress  EYES: Eyes  grossly normal to inspection, and conjunctivae and sclerae normal  HENT: ear canals and TM's normal, nose and mouth without ulcers or lesions, oropharynx clear and oral mucous membranes moist  NECK: no adenopathy, no asymmetry, masses, or scars   RESP:   Decreased lung sounds throughout the right lung field [as per the patient's baseline].  O/w: lungs clear to auscultation - no rales, rhonchi or wheezes  CV: regular rate and rhythm, normal S1 S2, no S3 or S4, no murmur, click or rub, no peripheral edema and peripheral pulses strong  ABDOMEN:   Mild, epigastric tenderness to palpation   soft, nontender, no hepatosplenomegaly, no masses and bowel sounds normal  MS: no musculoskeletal defects are noted and gait is age appropriate without ataxia  SKIN: no suspicious lesions or rashes  NEURO: mentation intact and speech normal  PSYCH: mentation appears normal and affect normal/bright.      ASSESSMENT/PLAN:                                                        ICD-10-CM    1. Acute bronchitis, unspecified organism J20.9 amoxicillin-clavulanate (AUGMENTIN) 875-125 MG per tablet   2. Gastroesophageal reflux disease, esophagitis presence not specified K21.9      (J20.9) Acute bronchitis, unspecified organism  (primary encounter diagnosis)  Comment: as noted  Plan:   As per orders above and patient instructions below.-will choose abx to  Cover for aspiration pneumonia:  amoxicillin-clavulanate (AUGMENTIN) 875-125 MG         per tablet            (K21.9) Gastroesophageal reflux disease, esophagitis presence not specified  Comment: as per exam and recent history  Plan: As per orders above and patient instructions below.        See Patient Instructions  Patient Instructions   Please take the antibiotics and let us know if you are not better after 3 complete days on it.   Please also try to use over the counter Zantac 300mg at night, consistently for the next 2-4 weeks-of you have another episode of nausea/vomiting, or if you  "feeling of something being stuck in the throat when you swallow persists, let us know and we can consider ordering an upper endoscopy (\"EGD\") at that time.   Please use the CPAP consistently.                      Ayanna Crandall MD  [facility]  "

## 2017-06-05 NOTE — LETTER
[facility]  [address] [facility] [address]  Phone: [phone]  Fax: [phone]    June 5, 2017        Cheyanne ALDA Patriciaamelie  [address] [place] [address]          To whom it may concern:    RE: Cheyanne Guardado    This patient has an acute illness. Please excuse her from all work duties for the days of 6.5.17 through 6.7.17.    Please contact me for questions or concerns.      Sincerely,        Ayanna Crandall MD

## 2017-06-07 ENCOUNTER — TELEPHONE (OUTPATIENT)
Dept: PULMONOLOGY | Facility: CLINIC | Age: 57
End: 2017-06-07

## 2017-06-07 ENCOUNTER — OFFICE VISIT (OUTPATIENT)
Dept: PSYCHOLOGY | Facility: CLINIC | Age: 57
End: 2017-06-07

## 2017-06-07 DIAGNOSIS — F54 PSYCHOLOGICAL FACTORS AFFECTING MORBID OBESITY (H): ICD-10-CM

## 2017-06-07 DIAGNOSIS — F10.21 ALCOHOL DEPENDENCE IN SUSTAINED FULL REMISSION (H): Primary | ICD-10-CM

## 2017-06-07 DIAGNOSIS — F33.0 MAJOR DEPRESSIVE DISORDER, RECURRENT EPISODE, MILD (H): ICD-10-CM

## 2017-06-07 DIAGNOSIS — E66.01 PSYCHOLOGICAL FACTORS AFFECTING MORBID OBESITY (H): ICD-10-CM

## 2017-06-07 NOTE — MR AVS SNAPSHOT
After Visit Summary   6/7/2017    Cheyanne Guardado    MRN: [id number]           Patient Information     Date Of Birth          1960        Visit Information        Provider Department      6/7/2017 4:00 PM Iza Padilla, PhD [facility]        Today's Diagnoses     Alcohol dependence in sustained full remission (H)    -  1    Major depressive disorder, recurrent episode, mild (H)        Psychological factors affecting morbid obesity (H)           Follow-ups after your visit        Who to contact     Please call your clinic at [phone] to:    Ask questions about your health    Make or cancel appointments    Discuss your medicines    Learn about your test results    Speak to your doctor   If you have compliments or concerns about an experience at your clinic, or if you wish to file a complaint, please contact [facility] Patient Relations at [phone] or email us at [email or web address]         Additional Information About Your Visit        MyChart Information     [employer] gives you secure access to your electronic health record. If you see a primary care provider, you can also send messages to your care team and make appointments. If you have questions, please call your primary care clinic.  If you do not have a primary care provider, please call [phone] and they will assist you.      [employer] is an electronic gateway that provides easy, online access to your medical records. With [employer], you can request a clinic appointment, read your test results, renew a prescription or communicate with your care team.     To access your existing account, please contact your [facility] or call [phone] for assistance.        Care EveryWhere ID     This is your Care EveryWhere ID. This could be used by other organizations to access your [place] medical records  [id number]         Blood Pressure from Last 3  Encounters:   06/05/17 (!) 128/98   04/27/17 131/85   09/15/16 (!) 116/94    Weight from Last 3 Encounters:   06/05/17 90.7 kg (200 lb)   04/27/17 92.9 kg (204 lb 12.8 oz)   09/15/16 89.4 kg (197 lb)              Today, you had the following     No orders found for display       Primary Care Provider Office Phone # Fax #    Antwon Almazan -[phone]       [facility] [address] [facility] [address]        Thank you!     Thank you for choosing [facility]  for your care. Our goal is always to provide you with excellent care. Hearing back from our patients is one way we can continue to improve our services. Please take a few minutes to complete the written survey that you may receive in the mail after your visit with us. Thank you!             Your Updated Medication List - Protect others around you: Learn how to safely use, store and throw away your medicines at www.disposemymeds.org.          This list is accurate as of: 6/7/17 11:59 PM.  Always use your most recent med list.                   Brand Name Dispense Instructions for use    amoxicillin-clavulanate 875-125 MG per tablet    AUGMENTIN    20 tablet    Take 1 tablet by mouth 2 times daily       clonazePAM 0.5 MG tablet    klonoPIN         estradiol 10 MCG Tabs vaginal tablet    VAGIFEM    30 tablet    Insert one vaginally nightly for 2 weeks, then reduce to three times weekly       hydrochlorothiazide 25 MG tablet    HYDRODIURIL    90 tablet    Take 1 tablet (25 mg) by mouth daily       LOSARTAN POTASSIUM PO      Take by mouth daily       MODAFINIL PO      Take 100 mg by mouth daily       PROZAC PO      Take 40 mg by mouth daily       ZYRTEC ALLERGY PO      Take  by mouth.

## 2017-06-07 NOTE — TELEPHONE ENCOUNTER
[facility]     [address]     [facility] [address]    Phone:  [phone]    Fax:  [phone]       Thank You for choosing us for your health care visit. We are glad to serve you and happy to provide you with this summary of your visit. Please help us to ensure we have accurate records. If you find anything that needs to be changed, please let our staff know as soon as possible.          Your Demographic Information     Patient Name Sex David Suarez Jr. 1947       Ethnic Group Patient Race    Not of  or  Origin Black/      Your Visit Details     Date & Time Provider Department    2017 9:30 AM Jomar Perez DO [facility]        Your Upcoming Appointment*(Max 10)     Monday May 22, 2017  9:30 AM CDT   Follow-up Visit with Jomar Perez DO   [facility]   ([facility] )    [address]   [facility] [address]   [phone]              Your To Do List     Future Orders Please Complete On or Around Expires    PROSTATE SPECIFIC ANTIGEN  2017 (Approximate) Sep 21, 2017      Conditions Discussed Today or Order-Related Diagnoses        Comments    Prostate cancer screening    -  Primary       Your Vitals Were     BP Pulse Temp Weight SpO2 BMI    122/75 (BP Location: LUE, Patient Position: Sitting, Cuff Size: Regular) 59 98 °F (36.7 °C) (Oral) 145 lb (65.8 kg) 99% 22.71 kg/m2    Smoking Status                   Former Smoker           Medications Prescribed or Re-Ordered Today     diphenhydrAMINE (BENADRYL) 25 MG tablet    Sig - Route: Take 1 tablet by mouth every 6 hours as needed for Sleep. - Oral    Class: Eprescribe    Pharmacy: [facility] [address] [place] [address] [address] Ph #: [phone]    donepezil (ARICEPT) 5 MG tablet    Sig - Route: Take 1 tablet by mouth Every evening. -  .tm     Oral    Class: Eprescribe    Pharmacy: [facility] [id number] [place] [address] [address] Ph #: [phone]    aspirin 81 MG tablet    Sig - Route: Take 1 tablet by mouth daily. - Oral    Class: Script Not Printed    Pharmacy: [facility] [id number] [place] [address] [address] Ph #: [phone]      Your Current Medications Are        Disp Refills Start End    fluticasone (FLONASE) 50 MCG/ACT nasal spray 16 g 0 1/4/2017     Sig: USE 2 SPRAYS IN EACH NOSTRIL DAILY    Class: Eprescribe    acetaminophen-codeine (TYLENOL WITH CODEINE) 120-12 MG/5ML solution 450 mL 1 1/3/2017     Sig - Route: Take 5 mLs by mouth every 8 hours as needed for Pain. - Oral    citalopram (CELEXA) 20 MG tablet 90 tablet 0 12/27/2016     Sig: TAKE 1 TABLET BY MOUTH EVERY DAY    Class: Eprescribe    HYDROcodone-acetaminophen 7.5-325 mg/15 mL solution 240 mL 0 11/23/2016     Sig - Route: Take 5-10 mLs by mouth 4 times daily as needed for Pain. - Oral    tiZANidine (ZANAFLEX) 2 MG tablet 30 tablet 2 11/15/2016     Sig: TAKE 1 TABLET BY MOUTH EVERY 6 HOURS AS NEEDED FOR MUSCLE SPASMS.    Class: Eprescribe    budesonide/formoterol (SYMBICORT) 80-4.5 MCG/ACT inhaler 1 Inhaler 12 11/14/2016     Sig - Route: Inhale 2 puffs into the lungs 2 times daily. - Inhalation    Class: Eprescribe    albuterol 108 (90 BASE) MCG/ACT inhaler 1 Inhaler 5 11/14/2016     Sig - Route: Inhale 2 puffs into the lungs every 4 hours as needed for Shortness of Breath or Wheezing. - Inhalation    Class: Eprescribe    Vitamin D, Ergocalciferol, 72292 UNITS capsule 6 capsule 5 11/1/2016     Sig: TAKE ONE CAPSULE BY MOUTH EVERY 2 WEEKS.    Class: Eprescribe    tamsulosin (FLOMAX) 0.4 MG Cap 30 capsule 5 11/1/2016     Sig: TAKE ONE CAPSULE BY MOUTH DAILY AFTER A MEAL    Class: Eprescribe    Cosign for Ordering: Accepted by Jomar Perez DO on 11/2/2016  6:49 AM    moxifloxacin (VIGAMOX) 0.5 % ophthalmic solution  3 mL 0 9/28/2016     Sig - Route: Place 1 drop into right eye every 2 hours. WHILE AWAKE UNTIL SEEN BY MD TOMORROW - Right Eye    Class: Add to AVS    ketorolac (ACULAR) 0.5 % ophthalmic solution 5 mL 0 9/28/2016     Sig - Route: Place 1 drop into right eye every 4 hours. WHILE AWAKE UNTIL SEEN BY MD TOMORROW - Right Eye    Class: Add to AVS    prednisoLONE acetate (PRED FORTE, OMNIPRED) 1 % ophthalmic suspension 5 mL 0 9/28/2016     Sig - Route: Place 1 drop into right eye every 4 hours. WHILE AWAKE UNTIL SEEN BY MD TOMORROW OR AS DIRECTED BY YOUR DOCTOR - Right Eye    Class: Add to AVS    levothyroxine (SYNTHROID, LEVOTHROID) 75 MCG tablet 30 tablet 6 9/13/2016     Sig - Route: Take 1 tablet by mouth daily. - Oral    Class: Eprescribe    omeprazole (PRILOSEC) 40 MG capsule 30 capsule 11 8/3/2016     Sig: TAKE 1 CAPSULE BY MOUTH EVERY DAY 1/2 HOUR PRIOR TO MORNING MEAL    Class: Eprescribe    montelukast (SINGULAIR) 10 MG tablet 30 tablet 4 6/28/2016     Sig - Route: Take 1 tablet by mouth nightly. - Oral    Class: Eprescribe    tadalafil (CIALIS) 20 MG tablet 10 tablet 1 6/28/2016     Sig - Route: Take 0.5 tablets by mouth as needed for Erectile Dysfunction. - Oral    pregabalin (LYRICA) 50 MG capsule 30 capsule 7 3/28/2016     Sig - Route: Take 1 capsule by mouth nightly. - Oral    Class: Script Not Printed    cycloSPORINE (RESTASIS) 0.05 % ophthalmic emulsion        Sig - Route: Place 1 drop into both eyes 2 times daily. - Both Eyes    Class: Historical Med    cholecalciferol (VITAMIN D3) 1000 UNITS tablet 30 tablet 8 9/1/2015     Sig - Route: Take 2,000 Units by mouth 2 times daily.  - Oral    Class: Historical Med    albuterol-ipratropium 2.5 mg/0.5 mg (DUONEB) 0.5-2.5 (3) MG/3ML nebulizer solution 90 mL 11 8/14/2015     Sig: USE 1 VIAL PER NEBULIZER EVERY 6 HOURS AS NEEDED FOR WHEEZING OR SHORTNESS OF BREATH    Class: Eprescribe    diphenhydrAMINE (BENADRYL) 25 MG tablet 30 tablet 3 1/24/2017     Sig - Route:  Take 1 tablet by mouth every 6 hours as needed for Sleep. - Oral    Class: Eprescribe    donepezil (ARICEPT) 5 MG tablet 30 tablet 5 1/24/2017     Sig - Route: Take 1 tablet by mouth Every evening. - Oral    Class: Eprescribe    aspirin 81 MG tablet 30 tablet 4 1/24/2017     Sig - Route: Take 1 tablet by mouth daily. - Oral    Class: Script Not Printed      Allergies     No Known Allergies      Immunizations History as of 1/24/2017     Name Date    Influenza 10/6/2015, 8/31/2013, 9/23/2011, 9/20/2010, 9/15/2009, 10/8/2008, 10/10/2006, 10/17/2003, 12/11/1995    Influenza A novel H1N1 11/23/2009    Influenza High Dose Pres Free 9/13/2016    Pneumococcal Conjugate 13 Valent 10/16/2015    Pneumococcal Polysaccharide Adult 8/26/2014, 10/30/2002      Problem List as of 1/24/2017     GERD (gastroesophageal reflux disease)    Hypothyroid    COPD (chronic obstructive pulmonary disease)    Depression    Vitamin D deficiency    H/O laryngeal cancer    History of syncope    Syncope            Patient Instructions     None

## 2017-06-12 NOTE — PROGRESS NOTES
"Health Psychology                                      Department of Medicine                                           [facility] Mail Code 741    Michelle Booker, Ph.D., L.P. [phone]  [address] [place] [address], [facility] Augusto Romero, Ph.D.,  L.P. [phone]  [place] [address]  Deon Pinzon, Ph.D., A.B.P.P., L.P. [phone]   ________________________________________________________________________________________________  Health Psychology - Follow up Visit  Confidential Summary*    REFERRAL SOURCE  [facility] bariatric surgery    CHIEF COMPLAINT/REASON FOR VISIT  Cognitive behavioral therapy and behavioral counseling in context of health and behavior issues related to weight loss and upcoming weight loss surgery/gastric sleeve surgery.       Patient was seen today for a 60 minute individual health and behavior intervention session.    Subjective:  Patients weight today is 199.8.  She reported that she has made an effort to eat more sensibly. She is not self monitoring but reported that she is attempting to target 2033-6877 daily.  Confronted her on the difficulty in determining caloric intake if not self monitoring.      Discussed her concerns that she has felt increasingly \"wiped out\" lately and described episodes of dizziness.  . She was encouraged to discuss with her provider.  She described concern regarding her lung capacity. She described increased motivation for weight loss and concern that if she does not have bariatric surgery, she may not live long.      She is eager to be healthy so that she might enjoy her new grandchild.      Discussed 1500 as her new caloric target for weight loss.  Encouraged  eating from drinking.      Will suggest that she order the [facility] weight loss materials.     Objective:  Patient was on time for today s session, appropriately groomed and dressed, and demonstrated good eye contact.  She " appeared friendly, alert and oriented.  Mood was euthymic, with appropriate range of affect. Patient denied suicidal or assaultive ideation, plan, or intent.        Assessment:  The patient has a longstanding history of obesity and continues to report current main motivation for weight loss surgery as physical health concerns and her desire to be more physically active and able to interact with her upcoming grandchild.  She is continuing to prepare for weight loss surgery through education, working to improve portion control, and considering ways to increase her activity level and to increase recreational activities.      Plan:  Patient will be seen in 1 week.      Time In:  4:00  Time Out:  5:00    Diagnosis:  Axis I Mdd, mild; psych factors impacting health, substance dependence in full sustained remission   Axis II Deferred   Axis III Obesity (278.00), please see medical records for details   Nunnelly IV Psychosocial and Environmental Stressors: Health & family struggles       Iza Padilla, Ph.D., L.P.      *In accordance with the Rules of the [place] Board of [employer], it is noted that psychological descriptions and scientific procedures underlying psychological evaluations have limitations.  Absolute predictions cannot be made based on information in this report.

## 2017-06-14 ENCOUNTER — OFFICE VISIT (OUTPATIENT)
Dept: PSYCHOLOGY | Facility: CLINIC | Age: 57
End: 2017-06-14

## 2017-06-14 DIAGNOSIS — F33.0 MAJOR DEPRESSIVE DISORDER, RECURRENT EPISODE, MILD (H): Primary | ICD-10-CM

## 2017-06-14 DIAGNOSIS — E66.01 PSYCHOLOGICAL FACTORS AFFECTING MORBID OBESITY (H): ICD-10-CM

## 2017-06-14 DIAGNOSIS — F10.21 ALCOHOL DEPENDENCE IN SUSTAINED FULL REMISSION (H): ICD-10-CM

## 2017-06-14 DIAGNOSIS — F54 PSYCHOLOGICAL FACTORS AFFECTING MORBID OBESITY (H): ICD-10-CM

## 2017-06-14 NOTE — MR AVS SNAPSHOT
After Visit Summary   6/14/2017    Cheyanne Guardado    MRN: [id number]           Patient Information     Date Of Birth          1960        Visit Information        Provider Department      6/14/2017 4:00 PM Iza Padilla, PhD BRIAN [facility]        Today's Diagnoses     Major depressive disorder, recurrent episode, mild (H)    -  1    Psychological factors affecting morbid obesity (H)        Alcohol dependence in sustained full remission (H)           Follow-ups after your visit        Your next 10 appointments already scheduled     Jun 21, 2017  4:00 PM CDT   (Arrive by 3:45 PM)   Return Visit with Iza Padilla, PhD BRIAN   [facility] ([facility])    [address] [facility]  [address] [facility] [address] [phone]            Jul 24, 2017  3:30 PM CDT   SHORT with Ayanna Crandall MD   [facility]    [address] [facility] [address] [phone]              Who to contact     Please call your clinic at [phone] to:    Ask questions about your health    Make or cancel appointments    Discuss your medicines    Learn about your test results    Speak to your doctor   If you have compliments or concerns about an experience at your clinic, or if you wish to file a complaint, please contact [facility] Patient Relations at [phone] or email us at [email or web address]         Additional Information About Your Visit        [employer] Information     [employer] gives you secure access to your electronic health record. If you see a primary care provider, you can also send messages to your care team and make appointments. If you have questions, please call your primary care clinic.  If you do not have a primary care provider, please call [phone] and they will assist you.      [employer] is an electronic gateway that provides easy, online  access to your medical records. With backstitch, you can request a clinic appointment, read your test results, renew a prescription or communicate with your care team.     To access your existing account, please contact your [facility] or call [phone] for assistance.        Care EveryWhere ID     This is your Care EveryWhere ID. This could be used by other organizations to access your [place] medical records  [id number]         Blood Pressure from Last 3 Encounters:   06/19/17 (!) 125/93   06/05/17 (!) 128/98   04/27/17 131/85    Weight from Last 3 Encounters:   06/19/17 92.1 kg (203 lb)   06/05/17 90.7 kg (200 lb)   04/27/17 92.9 kg (204 lb 12.8 oz)              Today, you had the following     No orders found for display       Primary Care Provider Office Phone # Fax #    Antwon Almazan [phone]       [facility] [address] [facility] [address]        Thank you!     Thank you for choosing [facility]  for your care. Our goal is always to provide you with excellent care. Hearing back from our patients is one way we can continue to improve our services. Please take a few minutes to complete the written survey that you may receive in the mail after your visit with us. Thank you!             Your Updated Medication List - Protect others around you: Learn how to safely use, store and throw away your medicines at www.disposemymeds.org.          This list is accurate as of: 6/14/17 11:59 PM.  Always use your most recent med list.                   Brand Name Dispense Instructions for use    clonazePAM 0.5 MG tablet    klonoPIN         MODAFINIL PO      Take 100 mg by mouth daily       PROZAC PO      Take 40 mg by mouth daily       ZYRTEC ALLERGY PO      Take  by mouth.

## 2017-06-19 ENCOUNTER — OFFICE VISIT (OUTPATIENT)
Dept: INTERNAL MEDICINE | Facility: CLINIC | Age: 57
End: 2017-06-19
Payer: COMMERCIAL

## 2017-06-19 VITALS
DIASTOLIC BLOOD PRESSURE: 93 MMHG | OXYGEN SATURATION: 98 % | HEART RATE: 80 BPM | SYSTOLIC BLOOD PRESSURE: 125 MMHG | BODY MASS INDEX: 41 KG/M2 | WEIGHT: 203 LBS | TEMPERATURE: 98.1 F

## 2017-06-19 DIAGNOSIS — M79.602 PAIN OF LEFT UPPER EXTREMITY: Primary | ICD-10-CM

## 2017-06-19 DIAGNOSIS — I10 ESSENTIAL HYPERTENSION: ICD-10-CM

## 2017-06-19 DIAGNOSIS — K21.9 GASTROESOPHAGEAL REFLUX DISEASE, ESOPHAGITIS PRESENCE NOT SPECIFIED: ICD-10-CM

## 2017-06-19 PROCEDURE — 99215 OFFICE O/P EST HI 40 MIN: CPT | Performed by: INTERNAL MEDICINE

## 2017-06-19 PROCEDURE — 93000 ELECTROCARDIOGRAM COMPLETE: CPT | Performed by: INTERNAL MEDICINE

## 2017-06-19 RX ORDER — HYDROCHLOROTHIAZIDE 25 MG/1
25 TABLET ORAL DAILY
Qty: 90 TABLET | Refills: 1 | Status: SHIPPED | OUTPATIENT
Start: 2017-06-19 | End: 2022-11-30

## 2017-06-19 RX ORDER — LOSARTAN POTASSIUM 100 MG/1
100 TABLET ORAL DAILY
Qty: 90 TABLET | Refills: 0 | Status: SHIPPED | OUTPATIENT
Start: 2017-06-19 | End: 2022-11-30

## 2017-06-19 NOTE — MR AVS SNAPSHOT
After Visit Summary   6/19/2017    Cheyanne Guardado    MRN: [id number]           Patient Information     Date Of Birth          1960        Visit Information        Provider Department      6/19/2017 10:10 AM Ayanna Crandall MD [facility]        Today's Diagnoses     Pain of left upper extremity    -  1    Essential hypertension        Gastroesophageal reflux disease, esophagitis presence not specified          Care Instructions    For your heartburn:  Please try to take the Zantac 300mg consistently every night.  Start taking Omeprazole 20mg every morning.  If your heartburn symptoms are not significantly better in 2 weeks, let us know and we will schedule an upper endoscopy.     For your elevated blood pressure:  Please increase the losartan from 50mg daily to 100mg daily.  return to clinic in a month: for blood pressure follow-up.            Follow-ups after your visit        Your next 10 appointments already scheduled     Jun 21, 2017  4:00 PM CDT   (Arrive by 3:45 PM)   Return Visit with Iza Padilla, PhD [facility] ([facility])    [address] [place] [address] [phone]              Who to contact     If you have questions or need follow up information about today's clinic visit or your schedule please contact [facility] directly at [phone].  Normal or non-critical lab and imaging results will be communicated to you by MyChart, letter or phone within 4 business days after the clinic has received the results. If you do not hear from us within 7 days, please contact the clinic through MyChart or phone. If you have a critical or abnormal lab result, we will notify you by phone as soon as possible.  Submit refill requests through [employer] or call your pharmacy and they will forward the refill request to us. Please allow 3 business days for your refill to be completed.           Additional Information About Your Visit        [employer] Information     [employer] gives you secure access to your electronic health record. If you see a primary care provider, you can also send messages to your care team and make appointments. If you have questions, please call your primary care clinic.  If you do not have a primary care provider, please call [phone] and they will assist you.        Care EveryWhere ID     This is your Care EveryWhere ID. This could be used by other organizations to access your [place] medical records  [id number]        Your Vitals Were     Pulse Temperature Pulse Oximetry BMI (Body Mass Index)          80 98.1  F (36.7  C) (Oral) 98% 41 kg/m2         Blood Pressure from Last 3 Encounters:   06/19/17 (!) 125/93   06/05/17 (!) 128/98   04/27/17 131/85    Weight from Last 3 Encounters:   06/19/17 203 lb (92.1 kg)   06/05/17 200 lb (90.7 kg)   04/27/17 204 lb 12.8 oz (92.9 kg)              We Performed the Following     EKG 12-lead complete w/read - Clinics          Today's Medication Changes          These changes are accurate as of: 6/19/17 12:11 PM.  If you have any questions, ask your nurse or doctor.               Start taking these medicines.        Dose/Directions    omeprazole 20 MG CR capsule   Commonly known as:  priLOSEC   Used for:  Gastroesophageal reflux disease, esophagitis presence not specified   Started by:  Ayanna Crandall MD        Dose:  20 mg   Take 1 capsule (20 mg) by mouth daily   Quantity:  14 capsule   Refills:  1         These medicines have changed or have updated prescriptions.        Dose/Directions    losartan 100 MG tablet   Commonly known as:  COZAAR   This may have changed:    - medication strength  - how much to take   Used for:  Essential hypertension   Changed by:  Ayanna Crandall MD        Dose:  100 mg   Take 1 tablet (100 mg) by mouth daily   Quantity:  90 tablet   Refills:  0            Where to get your  medicines      These medications were sent to [place], [place] - [address] [facility]  [address] [facility], [facility] [address]     Phone:  [phone]     hydrochlorothiazide 25 MG tablet    losartan 100 MG tablet    omeprazole 20 MG CR capsule                Primary Care Provider Office Phone # Fax #    Antwon Almazan [phone]       [facility] [address] [facility] [address]        Thank you!     Thank you for choosing [facility]  for your care. Our goal is always to provide you with excellent care. Hearing back from our patients is one way we can continue to improve our services. Please take a few minutes to complete the written survey that you may receive in the mail after your visit with us. Thank you!             Your Updated Medication List - Protect others around you: Learn how to safely use, store and throw away your medicines at www.disposemymeds.org.          This list is accurate as of: 6/19/17 12:11 PM.  Always use your most recent med list.                   Brand Name Dispense Instructions for use    clonazePAM 0.5 MG tablet    klonoPIN         hydrochlorothiazide 25 MG tablet    HYDRODIURIL    90 tablet    Take 1 tablet (25 mg) by mouth daily       losartan 100 MG tablet    COZAAR    90 tablet    Take 1 tablet (100 mg) by mouth daily       MODAFINIL PO      Take 100 mg by mouth daily       omeprazole 20 MG CR capsule    priLOSEC    14 capsule    Take 1 capsule (20 mg) by mouth daily       PROZAC PO      Take 40 mg by mouth daily       ZYRTEC ALLERGY PO      Take  by mouth.

## 2017-06-19 NOTE — NURSING NOTE
"Chief Complaint   Patient presents with     Hypertension     Gastric Problem     burning, nausea       Initial Pulse 80  Temp 98.1  F (36.7  C) (Oral)  Wt 203 lb (92.1 kg)  SpO2 98%  BMI 41 kg/m2 Estimated body mass index is 41 kg/(m^2) as calculated from the following:    Height as of 9/15/16: 4' 11\" (1.499 m).    Weight as of this encounter: 203 lb (92.1 kg).  Medication Reconciliation: complete    Ria Crespo CMA  "

## 2017-06-19 NOTE — PATIENT INSTRUCTIONS
For your heartburn:  Please try to take the Zantac 300mg consistently every night.  Start taking Omeprazole 20mg every morning.  If your heartburn symptoms are not significantly better in 2 weeks, let us know and we will schedule an upper endoscopy.     For your elevated blood pressure:  Please increase the losartan from 50mg daily to 100mg daily.  return to clinic in a month: for blood pressure follow-up.

## 2017-06-19 NOTE — PROGRESS NOTES
SUBJECTIVE:                                                    Cheyanne Guardado is a 57 year old female who presents to clinic today for the following health issues:      Hypertension Follow-up      Outpatient blood pressures are being checked at home.  Results are <140/90.    Low Salt Diet: not monitoring salt       Amount of exercise or physical activity: None    Problems taking medications regularly: No    Medication side effects: none    Diet: regular (no restrictions)      Last seen by me 2 weeks ago (you may refer to that 6.5.17 OV note).  She is on hydrochlorothiazide 25mg daily and on Losartan 50mg daily (per the Reconcile Med list).    Patient had an episode of left arm pain today.  She had several cardiac tests, starting with a nuclear stress test, ECHO, and eventually a cardiac MRI, a year ago, and eventually Cardiology did not advise any cardiac intervention or further work up at that point.      GERD/Heartburn     Onset: Was seen about a year ago for it but nothing seems to change and its getting worse.    Description:     Burning in chest: YES    Intensity: moderate to  severe    Progression of Symptoms: worsening    Accompanying Signs & Symptoms:  Does it feel like food gets stuck: YES- lower area, right before entering stomach  Nausea: no   Vomiting (bloody?): YES- not bloody-partially digested food; does not look like coffee grounds  Abdominal Pain: no   Black-Tarry stools: no :  Bloody stools: no    History:   Previous ulcers: no     Precipitating factors:   Caffeine use: YES- 1 cup a day  Alcohol use: no   NSAID/Aspirin use: YES- ibuprofen  Tobacco use: no   Worse with no particular food or drink.    Alleviating factors:  Zantac         Therapies Tried and outcome:food changes and zantac      Feeling of heartburn and of the food getting stuck in her throat.  See the last OV note: from 6.5.17-she was advised to take Zantac consistently at 300mg every bedtime; she has taken it 5 out of the last 7  days and feels its better when she takes it but worse if she misses a dose.   She has not been using the CPAP consistently, as the mask inadvertently gets off of her face, during sleep. She is in contact with the Sleep doctor about it.        Bronchitis:  Was seen on 17 by me and Rxed Augmentin (10 day course)-the cough has improved (less productive) but is not yet gone.      Disregard the pop-up regarding MERS...  Patient was flagged for MERS accrd to Epic-but denies any recent travel to the [place].    Problem list and histories reviewed & adjusted, as indicated.  Additional history: as documented        Patient Active Problem List   Diagnosis     HTN (hypertension)     CARDIOVASCULAR SCREENING; LDL GOAL LESS THAN 160     Irritable bowel disease     Carcinoid tumor     Cervical dysplasia     Anxiety     Advanced directives, counseling/discussion     Benign essential hypertension     Essential hypertension     Hypertension goal BP (blood pressure) < 140/90     Major depressive disorder, recurrent episode, mild (H)     Alcohol dependence in sustained full remission (H)     Psychological factors affecting morbid obesity (H)     Past Surgical History:   Procedure Laterality Date     C  DELIVERY ONLY      x3     HC HYSTEROSCOPY, SURGICAL; W/ ENDOMETRIAL ABLATION, ANY METHOD      10 years ago      SURGICAL HISTORY OF -       right lung removal     SURGICAL HISTORY OF -       ECT     TUBAL LIGATION         Social History   Substance Use Topics     Smoking status: Former Smoker     Packs/day: 1.50     Years: 20.00     Types: Cigarettes     Quit date: 1998     Smokeless tobacco: Never Used     Alcohol use No     Family History   Problem Relation Age of Onset     Hypertension Mother      HEART DISEASE Mother      endometrial cancer      CANCER Maternal Grandmother      renal/skin     Asthma Brother      Eczema Brother      Asthma Son      Eczema Father      Alzheimer Disease Father      Thyroid  Disease Sister      Thyroid Disease Sister          Current Outpatient Prescriptions   Medication Sig Dispense Refill     hydrochlorothiazide (HYDRODIURIL) 25 MG tablet Take 1 tablet (25 mg) by mouth daily 90 tablet 1     omeprazole (PRILOSEC) 20 MG CR capsule Take 1 capsule (20 mg) by mouth daily 14 capsule 1     losartan (COZAAR) 100 MG tablet Take 1 tablet (100 mg) by mouth daily 90 tablet 0     MODAFINIL PO Take 100 mg by mouth daily       FLUoxetine HCl (PROZAC PO) Take 40 mg by mouth daily       Cetirizine HCl (ZYRTEC ALLERGY PO) Take  by mouth.       clonazePAM (KLONOPIN) 0.5 MG tablet   2       Reviewed and updated as needed this visit by clinical staff  Tobacco  Allergies  Meds  Med Hx  Surg Hx  Fam Hx  Soc Hx      Reviewed and updated as needed this visit by Provider         ==============================================================  ROS:  Constitutional, HEENT, cardiovascular, pulmonary, GI, , musculoskeletal, neuro, skin, endocrine and psych systems are negative, except as otherwise noted.       OBJECTIVE:                                                    BP (!) 125/93  Pulse 80  Temp 98.1  F (36.7  C) (Oral)  Wt 203 lb (92.1 kg)  SpO2 98%  BMI 41 kg/m2  Body mass index is 41 kg/(m^2).     Vitals:    06/19/17 1051 06/19/17 1122   BP: (!) 126/96 (!) 125/93   Pulse: 80    Temp: 98.1  F (36.7  C)    TempSrc: Oral    SpO2: 98%    Weight: 203 lb (92.1 kg)      GENERAL APPEARANCE: healthy, alert and in no distress  ABDOMEN:  Tenderness to palpation of the epigastric region  O/w, soft, nontender, no hepatosplenomegaly, no masses and bowel sounds normal  MS: no musculoskeletal defects are noted and gait is age appropriate without ataxia  NEURO: mentation intact and speech normal  PSYCH: mentation appears normal and affect normal/bright.     EKG completed today and reviewed by me: NSR, no acute ischemic changes.    ASSESSMENT/PLAN:                                                        ICD-10-CM     1. Pain of left upper extremity M79.602 EKG 12-lead complete w/read - Clinics   2. Essential hypertension I10 hydrochlorothiazide (HYDRODIURIL) 25 MG tablet     losartan (COZAAR) 100 MG tablet   3. Gastroesophageal reflux disease, esophagitis presence not specified K21.9 omeprazole (PRILOSEC) 20 MG CR capsule     Time spent coordinating care was approximately 30 minutes out of a total of approximately 40 minutes (which was the total duration of the appointment) including the diagnosis, prognosis and treatment of the above medical conditions.     (M79.602) Pain of left upper extremity  (primary encounter diagnosis)  (I10) Essential hypertension  Comment: cardiac workup a year ago essentially unremarkable; EKG today within normal limits; arm pain may be MSK perhaps related to the higher DBPs;  Plan: EKG 12-lead complete w/read - Clinics        As per orders above and patient instructions below.   If s/s suspicious for angina recur, may need a repeat cardiac stress test   hydrochlorothiazide (HYDRODIURIL) 25 MG tablet,        losartan (COZAAR) 100 MG tablet            (K21.9) Gastroesophageal reflux disease, esophagitis presence not specified  Comment: partial symptom control after 2 weeks on an H2 blocker, still quite bothersome  Plan: omeprazole (PRILOSEC) 20 MG CR capsule        As per orders above and patient instructions below.        Patient Instructions   For your heartburn:  Please try to take the Zantac 300mg consistently every night.  Start taking Omeprazole 20mg every morning.  If your heartburn symptoms are not significantly better in 2 weeks, let us know and we will schedule an upper endoscopy.     For your elevated blood pressure:  Please increase the losartan from 50mg daily to 100mg daily.  return to clinic in a month: for blood pressure follow-up.                    Ayanna Crandall MD  [facility]

## 2017-06-20 NOTE — PROGRESS NOTES
"Health Psychology                                      Department of Medicine                                           [facility] Mail Code 741    Michelle Booker, Ph.D., L.P. [phone]  [address] [place] [address], STuyetTuyet Augusto Romero, Ph.D.,  L.P. [phone]  [place] [address]  Deon Pinzon, Ph.D., A.B.P.P., L.P. [phone]   ________________________________________________________________________________________________  Health Psychology - Follow up Visit  Confidential Summary*    REFERRAL SOURCE  [facility] bariatric surgery    CHIEF COMPLAINT/REASON FOR VISIT  Cognitive behavioral therapy and behavioral counseling in context of health and behavior issues related to weight loss and upcoming weight loss surgery/gastric sleeve surgery.       Patient was seen today for a 60 minute individual health and behavior intervention session.    TX PLAN COMPLETED TODAY, June 14, 2017.    Subjective:  Patients weight today is 201.4, up 2 pounds from one week ago.  She reported that she was diagnosed with pneumonia and has had little activity and a lot of sleep time.  She described feeling \"wiped out\".  She has been going to work and attended her AA meeting but has done little else.  She reported that she is not sure what she has eric eating that contributed to weight gain.  She is using myfitnesspal and is targeting 1500 daily calories. She continues to describe enthusiasm surrounding her daughters child.      The patient spend some session time discussing several painful situations in her family of origin and in particular, the disruption in her relationship with her step son and his children. The patient continues to grieve the loss of multiple important family members to miscommunications. She readily admits that her alcohol abuse may have played a role in some family dysfunction; however she also reported that her use was also the way she coped with " the dysfunction.      Discussed her coping tools and continued to encourage her to identify new tools to deal with negative emotions.  She will consider meditation.  She has not engaged in much activity to date.  Will continue to encourage once she feels well again.      Objective:  Patient was on time for today s session, appropriately groomed and dressed, and demonstrated good eye contact.  She appeared friendly, alert and oriented.  Mood was euthymic, with appropriate range of affect. Patient denied suicidal or assaultive ideation, plan, or intent.        Assessment:  The patient has a longstanding history of obesity and continues to report current main motivation for weight loss surgery as physical health concerns and her desire to be more physically active and able to interact with her upcoming grandchild.  She is continuing to prepare for weight loss surgery through education, working to improve portion control, and considering ways to increase her activity level and to increase recreational activities.      Plan:  Patient will be seen in 1 week.      Time In:  4:00  Time Out:  5:00    Diagnosis:  Axis I Mdd, mild; psych factors impacting health, substance dependence in full sustained remission   Axis II Deferred   Axis III Obesity (278.00), please see medical records for details   [place] IV Psychosocial and Environmental Stressors: Health & family struggles       Iza Padilla, Ph.D., L.P.      *In accordance with the Rules of the [place] Board of [employer], it is noted that psychological descriptions and scientific procedures underlying psychological evaluations have limitations.  Absolute predictions cannot be made based on information in this report.

## 2017-06-21 ENCOUNTER — OFFICE VISIT (OUTPATIENT)
Dept: PSYCHOLOGY | Facility: CLINIC | Age: 57
End: 2017-06-21

## 2017-06-21 DIAGNOSIS — F54 PSYCHOLOGICAL FACTORS AFFECTING MORBID OBESITY (H): ICD-10-CM

## 2017-06-21 DIAGNOSIS — F33.0 MAJOR DEPRESSIVE DISORDER, RECURRENT EPISODE, MILD (H): Primary | ICD-10-CM

## 2017-06-21 DIAGNOSIS — E66.01 PSYCHOLOGICAL FACTORS AFFECTING MORBID OBESITY (H): ICD-10-CM

## 2017-06-21 DIAGNOSIS — F10.21 ALCOHOL DEPENDENCE IN SUSTAINED FULL REMISSION (H): ICD-10-CM

## 2017-06-21 NOTE — MR AVS SNAPSHOT
After Visit Summary   6/21/2017    Cheyanne Guardado    MRN: [id number]           Patient Information     Date Of Birth          1960        Visit Information        Provider Department      6/21/2017 4:00 PM Iza Padilla, PhD BRIAN [facility]        Today's Diagnoses     Major depressive disorder, recurrent episode, mild (H)    -  1    Alcohol dependence in sustained full remission (H)        Psychological factors affecting morbid obesity (H)           Follow-ups after your visit        Your next 10 appointments already scheduled     Jun 28, 2017  4:00 PM CDT   (Arrive by 3:45 PM)   Return Visit with Iza Padilla, PhD BRIAN   [facility] ([facility])    [address] [facility] [address] [phone]            Jul 24, 2017  3:30 PM CDT   SHORT with Ayanna Crandall MD   [facility]    [address] [address] [phone]              Who to contact     Please call your clinic at [phone] to:    Ask questions about your health    Make or cancel appointments    Discuss your medicines    Learn about your test results    Speak to your doctor   If you have compliments or concerns about an experience at your clinic, or if you wish to file a complaint, please contact [facility] Patient Relations at [phone] or email us at [email or web address]         Additional Information About Your Visit        [employer]lyte Software[employer] Information     [employer] gives you secure access to your electronic health record. If you see a primary care provider, you can also send messages to your care team and make appointments. If you have questions, please call your primary care clinic.  If you do not have a primary care provider, please call [phone] and they will assist you.      [employer] is an electronic gateway that provides easy, online  access to your medical records. With [employer], you can request a clinic appointment, read your test results, renew a prescription or communicate with your care team.     To access your existing account, please contact your [facility] or call [phone] for assistance.        Care EveryWhere ID     This is your Care EveryWhere ID. This could be used by other organizations to access your [place] medical records  [id number]         Blood Pressure from Last 3 Encounters:   06/19/17 (!) 125/93   06/05/17 (!) 128/98   04/27/17 131/85    Weight from Last 3 Encounters:   06/19/17 92.1 kg (203 lb)   06/05/17 90.7 kg (200 lb)   04/27/17 92.9 kg (204 lb 12.8 oz)              Today, you had the following     No orders found for display       Primary Care Provider Office Phone # Fax #    Antwon Almazan [phone]       [facility] [address] [address] [facility] [address]        Equal Access to Services     JESSICA CARSON : Hadii aad ku hadasho Soomaali, waaxda luqadaha, qaybta kaalmada adeegyada, waxay idiin hayaan lisa rizo . So [facility] [phone].    ATENCIÓN: Si habla español, tiene a hinojosa disposición servicios gratuitos de asistencia lingüística. Llame al [phone].    We comply with applicable federal civil rights laws and [place] laws. We do not discriminate on the basis of race, color, national origin, age, disability sex, sexual orientation or gender identity.            Thank you!     Thank you for choosing [facility]  for your care. Our goal is always to provide you with excellent care. Hearing back from our patients is one way we can continue to improve our services. Please take a few minutes to complete the written survey that you may receive in the mail after your visit with us. Thank you!             Your Updated Medication List - Protect others around you: Learn how to safely use, store and throw away your medicines  at www.disposemymeds.org.          This list is accurate as of: 6/21/17 11:59 PM.  Always use your most recent med list.                   Brand Name Dispense Instructions for use Diagnosis    clonazePAM 0.5 MG tablet    klonoPIN          hydrochlorothiazide 25 MG tablet    HYDRODIURIL    90 tablet    Take 1 tablet (25 mg) by mouth daily    Essential hypertension       losartan 100 MG tablet    COZAAR    90 tablet    Take 1 tablet (100 mg) by mouth daily    Essential hypertension       MODAFINIL PO      Take 100 mg by mouth daily        omeprazole 20 MG CR capsule    priLOSEC    14 capsule    Take 1 capsule (20 mg) by mouth daily    Gastroesophageal reflux disease, esophagitis presence not specified       PROZAC PO      Take 40 mg by mouth daily        ZYRTEC ALLERGY PO      Take  by mouth.

## 2017-06-27 NOTE — PROGRESS NOTES
Health Psychology                                      Department of Medicine                                           [facility] Mail Code 746    Michelle Booker, Ph.D., L.P. [phone]  [address] [place] [address], [facility] Augusto Romero, Ph.D.,  L.P. [phone]  [place] [address]  Deon Pnizon, Ph.D., A.B.P.P., L.P. [phone]   ________________________________________________________________________________________________  Health Psychology - Follow up Visit  Confidential Summary*    REFERRAL SOURCE  [facility] bariatric surgery    CHIEF COMPLAINT/REASON FOR VISIT  Cognitive behavioral therapy and behavioral counseling in context of health and behavior issues related to weight loss and upcoming weight loss surgery/gastric sleeve surgery.       Patient was seen today for a 60 minute individual health and behavior intervention session.    Subjective:  Patients weight today is 201.9, up .5 pounds from one week ago.  She reported surprise that her weight is up and described frustration that she has actually not been able to eat secondary to nausea and vomiting.  Patient reported that she had a visit with her PCP who is treating her for heartburn and reflux but that she has not responded yet to the medication.  She reported that she has not been eating breakfast and a light meal for lunch and a normal dinner.  Confronted patient on the likelihood that she is consuming approximately 2K calories and encouraged rigorous self monitoring to increase understanding of weight gain.      She reported that she and her family had a good time on fathers day and that she prepared a meal.  In discussing meal, the patient described high fat, heavy options.  She was alerted to my observation that the foods may not be tolerated after surgery.  Encouraged her to begin to eat as she will need to after initial period following surgery passes.  Reviewed surgery  preparation materials.      She reported that she has been more active in the garden and that her  has been more helpful.  She also reported that  and daughter have been more stressed out surrounding issue at their shared job sight.      Objective:  Patient was on time for today s session, appropriately groomed and dressed, and demonstrated good eye contact.  She appeared friendly, alert and oriented.  Mood was euthymic, with appropriate range of affect. Patient denied suicidal or assaultive ideation, plan, or intent.        Assessment:  The patient has a longstanding history of obesity and continues to report current main motivation for weight loss surgery as physical health concerns and her desire to be more physically active and able to interact with her upcoming grandchild.  She is stalled in her preparation for weight loss surgery through education, working to improve portion control, and considering ways to increase her activity level and to increase recreational activities.      Plan:  Patient will be seen in 1 week.      Time In:  4:00  Time Out:  5:00    Diagnosis:  Axis I Mdd, mild; psych factors impacting health, substance dependence in full sustained remission   Axis II Deferred   Axis III Obesity (278.00), please see medical records for details   [place] IV Psychosocial and Environmental Stressors: Health & family struggles       Iza Padilla, Ph.D., L.P.      *In accordance with the Rules of the [place] Board of Psychology, it is noted that psychological descriptions and scientific procedures underlying psychological evaluations have limitations.  Absolute predictions cannot be made based on information in this report.

## 2017-07-05 ENCOUNTER — OFFICE VISIT (OUTPATIENT)
Dept: PSYCHOLOGY | Facility: CLINIC | Age: 57
End: 2017-07-05

## 2017-07-05 DIAGNOSIS — E66.01 PSYCHOLOGICAL FACTORS AFFECTING MORBID OBESITY (H): ICD-10-CM

## 2017-07-05 DIAGNOSIS — F54 PSYCHOLOGICAL FACTORS AFFECTING MORBID OBESITY (H): ICD-10-CM

## 2017-07-05 DIAGNOSIS — F10.21 ALCOHOL DEPENDENCE IN SUSTAINED FULL REMISSION (H): ICD-10-CM

## 2017-07-05 DIAGNOSIS — F33.0 MAJOR DEPRESSIVE DISORDER, RECURRENT EPISODE, MILD (H): Primary | ICD-10-CM

## 2017-07-05 NOTE — MR AVS SNAPSHOT
After Visit Summary   7/5/2017    Cheyanne Guardado    MRN: [id number]           Patient Information     Date Of Birth          1960        Visit Information        Provider Department      7/5/2017 4:00 PM Iza Padilla, PhD BRIAN [facility]        Today's Diagnoses     Major depressive disorder, recurrent episode, mild (H)    -  1    Alcohol dependence in sustained full remission (H)        Psychological factors affecting morbid obesity (H)           Follow-ups after your visit        Your next 10 appointments already scheduled     Jul 19, 2017  4:00 PM CDT   (Arrive by 3:45 PM)   Return Visit with Iza Padilla, PhD BRIAN   [facility] ([facility])    [address]  [address] [facility] [address] [phone]            Jul 24, 2017  3:30 PM CDT   SHORT with Ayanna Crandall MD   [facility]    [address] [address] [phone]              Who to contact     Please call your clinic at [phone] to:    Ask questions about your health    Make or cancel appointments    Discuss your medicines    Learn about your test results    Speak to your doctor   If you have compliments or concerns about an experience at your clinic, or if you wish to file a complaint, please contact [facility] Patient Relations at [phone] or email us at [email or web address]         Additional Information About Your Visit        Zoomoramahart Information     "[employer] gives you secure access to your electronic health record. If you see a primary care provider, you can also send messages to your care team and make appointments. If you have questions, please call your primary care clinic.  If you do not have a primary care provider, please call [phone] and they will assist you.      "[employer] is an electronic gateway that provides easy, online  access to your medical records. With [employer], you can request a clinic appointment, read your test results, renew a prescription or communicate with your care team.     To access your existing account, please contact your [facility] or call [phone] for assistance.        Care EveryWhere ID     This is your Care EveryWhere ID. This could be used by other organizations to access your [place] medical records  [id number]         Blood Pressure from Last 3 Encounters:   06/19/17 (!) 125/93   06/05/17 (!) 128/98   04/27/17 131/85    Weight from Last 3 Encounters:   06/19/17 92.1 kg (203 lb)   06/05/17 90.7 kg (200 lb)   04/27/17 92.9 kg (204 lb 12.8 oz)              Today, you had the following     No orders found for display       Primary Care Provider Office Phone # Fax #    Antwon Almazan [phone]       [facility] [address] [address]        Equal Access to Services     JESSICA CARSON : Hadii aad ku hadasho Soomaali, waaxda luqadaha, qaybta kaalmada adeegyada, waxay idiin hayaan lisa rizo . So [facility] [phone].    ATENCIÓN: Si habla español, tiene a hinojosa disposición servicios gratuitos de asistencia lingüística. Llame al [phone].    We comply with applicable federal civil rights laws and [place] laws. We do not discriminate on the basis of race, color, national origin, age, disability sex, sexual orientation or gender identity.            Thank you!     Thank you for choosing [facility]  for your care. Our goal is always to provide you with excellent care. Hearing back from our patients is one way we can continue to improve our services. Please take a few minutes to complete the written survey that you may receive in the mail after your visit with us. Thank you!             Your Updated Medication List - Protect others around you: Learn how to safely use, store and throw away your medicines  at www.disposemymeds.org.          This list is accurate as of: 7/5/17 11:59 PM.  Always use your most recent med list.                   Brand Name Dispense Instructions for use Diagnosis    clonazePAM 0.5 MG tablet    klonoPIN          hydrochlorothiazide 25 MG tablet    HYDRODIURIL    90 tablet    Take 1 tablet (25 mg) by mouth daily    Essential hypertension       losartan 100 MG tablet    COZAAR    90 tablet    Take 1 tablet (100 mg) by mouth daily    Essential hypertension       MODAFINIL PO      Take 100 mg by mouth daily        omeprazole 20 MG CR capsule    priLOSEC    14 capsule    Take 1 capsule (20 mg) by mouth daily    Gastroesophageal reflux disease, esophagitis presence not specified       PROZAC PO      Take 40 mg by mouth daily        ZYRTEC ALLERGY PO      Take  by mouth.

## 2017-07-19 ENCOUNTER — OFFICE VISIT (OUTPATIENT)
Dept: PSYCHOLOGY | Facility: CLINIC | Age: 57
End: 2017-07-19

## 2017-07-19 DIAGNOSIS — E66.01 PSYCHOLOGICAL FACTORS AFFECTING MORBID OBESITY (H): ICD-10-CM

## 2017-07-19 DIAGNOSIS — F33.0 MAJOR DEPRESSIVE DISORDER, RECURRENT EPISODE, MILD (H): Primary | ICD-10-CM

## 2017-07-19 DIAGNOSIS — F54 PSYCHOLOGICAL FACTORS AFFECTING MORBID OBESITY (H): ICD-10-CM

## 2017-07-19 DIAGNOSIS — F10.21 ALCOHOL DEPENDENCE IN SUSTAINED FULL REMISSION (H): ICD-10-CM

## 2017-07-19 NOTE — MR AVS SNAPSHOT
After Visit Summary   7/19/2017    Cheyanne Guardado    MRN: [id number]           Patient Information     Date Of Birth          1960        Visit Information        Provider Department      7/19/2017 4:00 PM Iza Padilla, PhD [facility]        Today's Diagnoses     Major depressive disorder, recurrent episode, mild (H)    -  1    Alcohol dependence in sustained full remission (H)        Psychological factors affecting morbid obesity (H)           Follow-ups after your visit        Your next 10 appointments already scheduled     Aug 09, 2017  4:00 PM CDT   (Arrive by 3:45 PM)   Return Visit with Iza Padilla, PhD BRIAN   [facility] ([facility])    [address] [address] [phone]              Who to contact     Please call your clinic at [phone] to:    Ask questions about your health    Make or cancel appointments    Discuss your medicines    Learn about your test results    Speak to your doctor   If you have compliments or concerns about an experience at your clinic, or if you wish to file a complaint, please contact [facility] Patient Relations at [phone] or email us at [email or web address]         Additional Information About Your Visit        MyChart Information     [employer] gives you secure access to your electronic health record. If you see a primary care provider, you can also send messages to your care team and make appointments. If you have questions, please call your primary care clinic.  If you do not have a primary care provider, please call [phone] and they will assist you.      [employer] is an electronic gateway that provides easy, online access to your medical records. With [employer], you can request a clinic appointment, read your test results, renew a prescription or communicate with your care team.     To access your existing  account, please contact your [facility] or call [phone] for assistance.        Care EveryWhere ID     This is your Care EveryWhere ID. This could be used by other organizations to access your [place] medical records  [id number]         Blood Pressure from Last 3 Encounters:   06/19/17 (!) 125/93   06/05/17 (!) 128/98   04/27/17 131/85    Weight from Last 3 Encounters:   06/19/17 92.1 kg (203 lb)   06/05/17 90.7 kg (200 lb)   04/27/17 92.9 kg (204 lb 12.8 oz)              Today, you had the following     No orders found for display       Primary Care Provider Office Phone # Fax #    Antwon Almazan [phone]       [facility] [address] [address]        Equal Access to Services     JESSICA CARSON : Hadii aad ku hadasho Soomaali, waaxda luqadaha, qaybta kaalmada adeegyada, waxay idiin hayaan adeyoana rizo . So [facility] [phone].    ATENCIÓN: Si habla español, tiene a hinojosa disposición servicios gratuitos de asistencia lingüística. Llame al [phone].    We comply with applicable federal civil rights laws and [place] laws. We do not discriminate on the basis of race, color, national origin, age, disability sex, sexual orientation or gender identity.            Thank you!     Thank you for choosing [facility]  for your care. Our goal is always to provide you with excellent care. Hearing back from our patients is one way we can continue to improve our services. Please take a few minutes to complete the written survey that you may receive in the mail after your visit with us. Thank you!             Your Updated Medication List - Protect others around you: Learn how to safely use, store and throw away your medicines at www.disposemymeds.org.          This list is accurate as of: 7/19/17 11:59 PM.  Always use your most recent med list.                   Brand Name Dispense Instructions for use Diagnosis     clonazePAM 0.5 MG tablet    klonoPIN          hydrochlorothiazide 25 MG tablet    HYDRODIURIL    90 tablet    Take 1 tablet (25 mg) by mouth daily    Essential hypertension       losartan 100 MG tablet    COZAAR    90 tablet    Take 1 tablet (100 mg) by mouth daily    Essential hypertension       MODAFINIL PO      Take 100 mg by mouth daily        omeprazole 20 MG CR capsule    priLOSEC    14 capsule    Take 1 capsule (20 mg) by mouth daily    Gastroesophageal reflux disease, esophagitis presence not specified       PROZAC PO      Take 40 mg by mouth daily        ZYRTEC ALLERGY PO      Take  by mouth.

## 2017-07-19 NOTE — PROGRESS NOTES
"Health Psychology                                      Department of Medicine                                           [facility] Mail Code 741    Michelle Booker, Ph.D., L.P. [phone]  [address] [place] [address], [facility] Augusto Romero, Ph.D.,  L.P. [phone]  [place] [address]  Deon Pinzon, Ph.D., A.B.P.P., L.P. [phone]       Iza Padilla, PhD, LP [phone]  ________________________________________________________________________________________________  Health Psychology - Follow up Visit  Confidential Summary*    REFERRAL SOURCE  [facility] bariatric surgery    CHIEF COMPLAINT/REASON FOR VISIT  Cognitive behavioral therapy and behavioral counseling in context of health and behavior issues related to weight loss and upcoming weight loss surgery/gastric sleeve surgery.       Patient was seen today for a 60 minute individual health and behavior intervention session.    Subjective:  Patients weight today is 202.8, up .5 pounds from one week ago.      Patient began with description of increased fatigue and \"I just don't feel good\".  She reported that she is concerned about her blue mood and described severe anhedonia - on weekends only.  She reported that during the week, she has energy and motivation and is able to work with no problems.  However, in the evenings, after she is home from work, she has little energy and drive to do more than sit on the couch and watch television and go to bed early.  She reported that her Prozac dose is at 40mg and she recently discontinued Vyvance because it increased her anxiety.      The patient, for the first time, discussed her dissatisfaction with her 10 year marriage.  She reported that he is a perfectionist and she feels that she is \"walking on eggshells\" trying to avoid upsetting his rigid expectations of her.  She described some pressure being put on her by him to increase their shared " activities together but she reported that she does not enjoy being with him.  Discussed the possible impact of dramatic weight loss on this relationship.  Patient reported that  has raised concerns about her leaving him.      She described continues difficulty socializing or doing activities alone after work secondary to fatigue and not liking the way she looks so is attempting to hide herself from her friends.  She openly acknowledged feeling improved when she forces herself to do something with others but that she can not overcome the inertia.      Encouraged patient to engage in pleasant activity planning on a daily basis.  Discussed the need to engage in manageable activities in order to experience success.        Objective:  Patient was on time for today s session, appropriately groomed and dressed, and demonstrated good eye contact.  She appeared friendly, alert and oriented.  Mood was dysphoric, with appropriate range of affect. Patient denied suicidal or assaultive ideation, plan, or intent.        Assessment:  The patient has a longstanding history of obesity and continues to report current main motivation for weight loss surgery as physical health concerns and her desire to be more physically active and able to interact with her upcoming grandchild.  She is preparing for weight loss surgery by addressing some mood and lifestyle challenges.      Plan:  Patient will be seen in 1 week.      Time In:  4:00  Time Out:  5:00    Diagnosis:  Axis I Mdd, mild; psych factors impacting health, substance dependence in full sustained remission   Axis II Deferred   Axis III Obesity (278.00), please see medical records for details   [place] IV Psychosocial and Environmental Stressors: Health & family struggles       Iza Padilla, Ph.D., L.P.      *In accordance with the Rules of the [place] Board of [employer], it is noted that psychological descriptions and scientific procedures underlying psychological  evaluations have limitations.  Absolute predictions cannot be made based on information in this report.

## 2017-08-07 LAB — PAP-ABSTRACT: NORMAL

## 2017-08-07 NOTE — PROGRESS NOTES
"Health Psychology                                      Department of Medicine                                           [facility] Mail Code 747    Michelle Booker, Ph.D., L.P. [phone]  [address] [place] [address], [facility] Augusto Romero, Ph.D.,  L.P. [phone]  [place] [address]  Deon Pinzon, Ph.D., A.B.P.P., L.P. [phone]       Iza Padilla, PhD, LP [phone]  ________________________________________________________________________________________________  Health Psychology - Follow up Visit  Confidential Summary*    REFERRAL SOURCE  [facility] bariatric surgery    CHIEF COMPLAINT/REASON FOR VISIT  Cognitive behavioral therapy and behavioral counseling in context of health and behavior issues related to weight loss and upcoming weight loss surgery/gastric sleeve surgery.       Patient was seen today for a 60 minute individual health and behavior intervention session.    Subjective:  Patient began with report that she has experienced increased nausea associated with heart burn.  Her providers have prescribed a combination of both zantac and omiprazole and her symptoms are in better control.      Patient asked that we not weigh her this week.  She reported that she feels that she has \"given up\" and is not able to fosus her limited energy on dietary intake or expenditure.      Discussed her blue mood and anhedonia and increased isolation and self loathing.  She is not certain what has precipitated this decrease in overall functioning.  She reported that she has been invited to both a wedding and a bachelorette party of a good friend and she declined both secondary to feelings of shame about her 40 pound weight gain.  However, she is planning to attend an [facility] \"because they don't know what I used to look like\".  Attempted to address her self consciousness using rational responses and the use of cognitive distortions but the patient is " not budging in her thinking.  She described spiritual emptiness but also selfishness that she is so concerned about the opinions of others about her that she is not able to address their needs.  She denied SI.      Patient described a history of receipt of ECT treatments which she had not mentioned before.      She described a long history characterized by difficulty trusting others and striving for acceptance.  She also identifies that she has attempted to present herself in ways that others will find acceptable and in the process believes that she has lost who she is.      Discussed the function of alcohol in her life and she was able to identify that it seemed to dull her constant self criticism and allowed her to decrease her inhibitions.  She believes that alcohol helped her to feel strong, clear, courageous and fearless and make her less afraid of being alone.      She is concerned that she has been both impulsive and reactionary in the past and regrets many decisions and past behavior.  Discussed the opportunity through working the AA steps to identify these and to make amends for a fresh start.      She continues to describe challenges in her communication with her .      Objective:  Patient was on time for today s session, appropriately groomed and dressed, and demonstrated good eye contact.  She appeared friendly, alert and oriented.  Mood was dysphoric, with appropriate range of affect. Patient denied suicidal or assaultive ideation, plan, or intent.        Assessment:  The patient has a longstanding history of obesity and continues to report current main motivation for weight loss surgery as physical health concerns and her desire to be more physically active and able to interact with her upcoming grandchild.  She is preparing for weight loss surgery by addressing some mood and lifestyle challenges.      Plan:  Patient will be seen in 1 week.      Time In:  4:00  Time Out:   5:00    Diagnosis:  Axis I Mdd, mild; psych factors impacting health, substance dependence in full sustained remission   Axis II Deferred   Axis III Obesity (278.00), please see medical records for details   [place] IV Psychosocial and Environmental Stressors: Health & family struggles       Iza Padilla, Ph.D., L.P.      *In accordance with the Rules of the [place] Board of Psychology, it is noted that psychological descriptions and scientific procedures underlying psychological evaluations have limitations.  Absolute predictions cannot be made based on information in this report.

## 2017-08-09 ENCOUNTER — OFFICE VISIT (OUTPATIENT)
Dept: PSYCHOLOGY | Facility: CLINIC | Age: 57
End: 2017-08-09

## 2017-08-09 DIAGNOSIS — F33.0 MAJOR DEPRESSIVE DISORDER, RECURRENT EPISODE, MILD (H): Primary | ICD-10-CM

## 2017-08-09 DIAGNOSIS — E66.01 PSYCHOLOGICAL FACTORS AFFECTING MORBID OBESITY (H): ICD-10-CM

## 2017-08-09 DIAGNOSIS — F54 PSYCHOLOGICAL FACTORS AFFECTING MORBID OBESITY (H): ICD-10-CM

## 2017-08-09 DIAGNOSIS — F10.21 ALCOHOL DEPENDENCE IN SUSTAINED FULL REMISSION (H): ICD-10-CM

## 2017-08-09 LAB
CHOLEST SERPL-MCNC: 186 MG/DL (ref 100–199)
HDLC SERPL-MCNC: 52 MG/DL
LDLC SERPL CALC-MCNC: 114 MG/DL
NONHDLC SERPL-MCNC: 134 MG/DL
TRIGL SERPL-MCNC: 101 MG/DL

## 2017-08-09 NOTE — MR AVS SNAPSHOT
After Visit Summary   8/9/2017    Cheyanne Guardado    MRN: [id number]           Patient Information     Date Of Birth          1960        Visit Information        Provider Department      8/9/2017 4:00 PM Iza Padilla, PhD [facility]        Today's Diagnoses     Major depressive disorder, recurrent episode, mild (H)    -  1    Alcohol dependence in sustained full remission (H)        Psychological factors affecting morbid obesity (H)           Follow-ups after your visit        Who to contact     Please call your clinic at [phone] to:    Ask questions about your health    Make or cancel appointments    Discuss your medicines    Learn about your test results    Speak to your doctor   If you have compliments or concerns about an experience at your clinic, or if you wish to file a complaint, please contact [facility] Patient Relations at [phone] or email us at [email or web address]         Additional Information About Your Visit        MyChart Information     [employer] gives you secure access to your electronic health record. If you see a primary care provider, you can also send messages to your care team and make appointments. If you have questions, please call your primary care clinic.  If you do not have a primary care provider, please call [phone] and they will assist you.      [email or web address] is an electronic gateway that provides easy, online access to your medical records. With [email or web address], you can request a clinic appointment, read your test results, renew a prescription or communicate with your care team.     To access your existing account, please contact your [facility] or call [phone] for assistance.        Care EveryWhere ID     This is your Care EveryWhere ID. This could be used by other organizations to access your [place] medical records  [id number]         Blood Pressure from Last 3  Encounters:   06/19/17 (!) 125/93   06/05/17 (!) 128/98   04/27/17 131/85    Weight from Last 3 Encounters:   06/19/17 92.1 kg (203 lb)   06/05/17 90.7 kg (200 lb)   04/27/17 92.9 kg (204 lb 12.8 oz)              Today, you had the following     No orders found for display       Primary Care Provider Office Phone # Fax #    Antwon Almazan [phone] [address] [facility] [address]        Equal Access to Services     [facility]: Hadii ti ku hadasho Soomaali, waaxda luqadaha, qaybta kaalmada adeyoanayada, edward rizo . So [facility] [phone].    ATENCIÓN: Si habla español, tiene a hinojosa disposición servicios gratuitos de asistencia lingüística. Llame al [phone].    We comply with applicable federal civil rights laws and [place] laws. We do not discriminate on the basis of race, color, national origin, age, disability sex, sexual orientation or gender identity.            Thank you!     Thank you for choosing [facility]  for your care. Our goal is always to provide you with excellent care. Hearing back from our patients is one way we can continue to improve our services. Please take a few minutes to complete the written survey that you may receive in the mail after your visit with us. Thank you!             Your Updated Medication List - Protect others around you: Learn how to safely use, store and throw away your medicines at www.disposemymeds.org.          This list is accurate as of: 8/9/17 11:59 PM.  Always use your most recent med list.                   Brand Name Dispense Instructions for use Diagnosis    clonazePAM 0.5 MG tablet    klonoPIN          hydrochlorothiazide 25 MG tablet    HYDRODIURIL    90 tablet    Take 1 tablet (25 mg) by mouth daily    Essential hypertension       losartan 100 MG tablet    COZAAR    90 tablet    Take 1 tablet (100 mg) by mouth daily    Essential hypertension       MODAFINIL PO      Take 100  mg by mouth daily        omeprazole 20 MG CR capsule    priLOSEC    14 capsule    Take 1 capsule (20 mg) by mouth daily    Gastroesophageal reflux disease, esophagitis presence not specified       PROZAC PO      Take 40 mg by mouth daily        ZYRTEC ALLERGY PO      Take  by mouth.

## 2017-08-10 NOTE — PROGRESS NOTES
Health Psychology                                      Department of Medicine                                           [facility] Mail Code 741    Michelle Booker, Ph.D., L.P. [phone]  [address] [place] [address], [facility] Augusto Romero, Ph.D.,  L.P. [phone]  [place] [address]  Deon Pinzon, Ph.D., A.B.P.P., L.P. [phone]       Iza Padilla, PhD, LP [phone]  ________________________________________________________________________________________________  Health Psychology - Follow up Visit  Confidential Summary*    REFERRAL SOURCE  [facility] bariatric surgery    CHIEF COMPLAINT/REASON FOR VISIT  Cognitive behavioral therapy and behavioral counseling in context of health and behavior issues related to weight loss and upcoming weight loss surgery/gastric sleeve surgery.       Patient was seen today for a 60 minute individual health and behavior intervention session.    Subjective:  Patient began with report that she brought  to session today.  Spent a few minutes with patient alone before we brought patient into session to establish limits to communication with .  She reported that she hoped to understand his perspective on her increased depressed mood over past 2 months and to get a sense of how he is coping with her mood instability.  We also discussed increasing his support and understanding any concerns he may have about bariatric surgery and dramatic weight loss.      Before  brought in, they had a brief phone exchange and this provider noted that patient sounded quite frustrated and angry at him.  During session, this was apparent at many times.  When brought to her attention, she reported that she often feels that he treats her as a child or as a patient.  The mild patronizing observed in her husbands comments about wife were discussed.  Patient able to express to him that she would like to feel that they  are equal partners and that she has responsibilities at the home and to him.  They discussed their discord around their children and her frustration that she is a mother to them but that he talks down to her in front of them.       described a tumultuous 20 year history with wife and his observation that her mood is up and down every few months. He described her alcohol abuse history, long history of disabling depression and personality disorder, which he reported is Borderline.  He reported that it appears that she is more stable now that she is sober, attending AA, in therapy both with me and with an addiction therapist at [facility].  However, he noted her severe anhedonia and fatigue of late and her refusal to attend two important weddings.  She has recently had several very low blood pressure readings.  He is concerned that she has been using her HCTZ to decrease fluid retention.  Patient admits that she has been using this medication as a diuretic to decrease bloating and did not realize it was a blood pressure medication.  It may be that her increased fatigue is associated with her intentional manipulation of the dose of this medication.  Strongly encouraged her to take her medications as prescribed and to admit to her provider what she has been doing to avoid unecessary changes.    Her psychiatrist has recently cut her prozac dose in half and added buspar in an apparent attempt to address anhedonia. Patient is also on medafranil to assist in increasing energy.      She was able to confront him on her desire to have more time doing activities together and spending less time in basement, watching tv.  She also expressed her desire to talk to him more.  He was open to hearing her concerns and expressed that he is exhausted by the demands of his job and likes to retreat at home.  Came to compromise that they would each plan 3 evenings per week and during their day, they were able to determine what would happen  that evening.    The couple have not been on a vacation in years.  Given that their 20 year anniversary is in a few weeks, encouraged a real vacation to increase intimacy and shared memories together.      The couple is looking forward to the birth of their grandchild in September.      Objective:  Patient was on time for today s session, appropriately groomed and dressed, and demonstrated good eye contact.  She appeared friendly, alert and oriented.  Mood was dysphoric, with appropriate range of affect. Patient denied suicidal or assaultive ideation, plan, or intent.        Assessment:  The patient has a longstanding history of obesity and continues to report current main motivation for weight loss surgery as physical health concerns and her desire to be more physically active and able to interact with her upcoming grandchild.  She is preparing for weight loss surgery by addressing mood and lifestyle challenges.      Plan:  Patient will be seen in 1 week, again with  to address some marital challenges.    Time In:  4:00  Time Out:  5:00    Diagnosis:  Axis I Mdd, mild; psych factors impacting health, substance dependence in full sustained remission   Axis II Deferred   Axis III Obesity (278.00), please see medical records for details   [place] IV Psychosocial and Environmental Stressors: Health & family struggles       Iza Padilla, Ph.D., L.P.      *In accordance with the Rules of the [place] Board of [employer], it is noted that psychological descriptions and scientific procedures underlying psychological evaluations have limitations.  Absolute predictions cannot be made based on information in this report.

## 2017-08-16 ENCOUNTER — OFFICE VISIT (OUTPATIENT)
Dept: PSYCHOLOGY | Facility: CLINIC | Age: 57
End: 2017-08-16

## 2017-08-16 DIAGNOSIS — F54 PSYCHOLOGICAL FACTORS AFFECTING MORBID OBESITY (H): ICD-10-CM

## 2017-08-16 DIAGNOSIS — F33.0 MAJOR DEPRESSIVE DISORDER, RECURRENT EPISODE, MILD (H): Primary | ICD-10-CM

## 2017-08-16 DIAGNOSIS — F10.21 ALCOHOL DEPENDENCE IN SUSTAINED FULL REMISSION (H): ICD-10-CM

## 2017-08-16 DIAGNOSIS — E66.01 PSYCHOLOGICAL FACTORS AFFECTING MORBID OBESITY (H): ICD-10-CM

## 2017-08-16 NOTE — MR AVS SNAPSHOT
After Visit Summary   8/16/2017    Cheyanne Guardado    MRN: [id number]           Patient Information     Date Of Birth          1960        Visit Information        Provider Department      8/16/2017 4:00 PM Iza Padilla, PhD [facility]        Today's Diagnoses     Major depressive disorder, recurrent episode, mild (H)    -  1    Alcohol dependence in sustained full remission (H)        Psychological factors affecting morbid obesity (H)           Follow-ups after your visit        Who to contact     Please call your clinic at [phone] to:    Ask questions about your health    Make or cancel appointments    Discuss your medicines    Learn about your test results    Speak to your doctor   If you have compliments or concerns about an experience at your clinic, or if you wish to file a complaint, please contact [facility] Patient Relations at [phone] or email us at [email or web address]         Additional Information About Your Visit        MyChart Information     [email or web address] gives you secure access to your electronic health record. If you see a primary care provider, you can also send messages to your care team and make appointments. If you have questions, please call your primary care clinic.  If you do not have a primary care provider, please call [phone] and they will assist you.      Univa is an electronic gateway that provides easy, online access to your medical records. With [employer], you can request a clinic appointment, read your test results, renew a prescription or communicate with your care team.     To access your existing account, please contact your [facility] or call [phone] for assistance.        Care EveryWhere ID     This is your Care EveryWhere ID. This could be used by other organizations to access your [facility] medical records  [id number]         Blood Pressure from Last 3  Encounters:   06/19/17 (!) 125/93   06/05/17 (!) 128/98   04/27/17 131/85    Weight from Last 3 Encounters:   06/19/17 92.1 kg (203 lb)   06/05/17 90.7 kg (200 lb)   04/27/17 92.9 kg (204 lb 12.8 oz)              Today, you had the following     No orders found for display       Primary Care Provider Office Phone # Fax #    Antwon Almazan [phone] [address] [facility] [address]        Equal Access to Services     [facility]: Hadii ti ku hadasho Soomaali, waaxda luqadaha, qaybta kaalmada adeyoanayada, edward rizo . So [facility] [phone].    ATENCIÓN: Si habla español, tiene a hinojosa disposición servicios gratuitos de asistencia lingüística. Llame al [phone].    We comply with applicable federal civil rights laws and [place] laws. We do not discriminate on the basis of race, color, national origin, age, disability sex, sexual orientation or gender identity.            Thank you!     Thank you for choosing [facility]  for your care. Our goal is always to provide you with excellent care. Hearing back from our patients is one way we can continue to improve our services. Please take a few minutes to complete the written survey that you may receive in the mail after your visit with us. Thank you!             Your Updated Medication List - Protect others around you: Learn how to safely use, store and throw away your medicines at www.disposemymeds.org.          This list is accurate as of: 8/16/17 11:59 PM.  Always use your most recent med list.                   Brand Name Dispense Instructions for use Diagnosis    clonazePAM 0.5 MG tablet    klonoPIN          hydrochlorothiazide 25 MG tablet    HYDRODIURIL    90 tablet    Take 1 tablet (25 mg) by mouth daily    Essential hypertension       losartan 100 MG tablet    COZAAR    90 tablet    Take 1 tablet (100 mg) by mouth daily    Essential hypertension       MODAFINIL PO      Take 100  mg by mouth daily        omeprazole 20 MG CR capsule    priLOSEC    14 capsule    Take 1 capsule (20 mg) by mouth daily    Gastroesophageal reflux disease, esophagitis presence not specified       PROZAC PO      Take 40 mg by mouth daily        ZYRTEC ALLERGY PO      Take  by mouth.

## 2017-08-18 ENCOUNTER — TELEPHONE (OUTPATIENT)
Dept: INTERNAL MEDICINE | Facility: CLINIC | Age: 57
End: 2017-08-18

## 2017-08-18 NOTE — TELEPHONE ENCOUNTER
Panel Management Review      Patient has the following on her problem list:     Hypertension   Last three blood pressure readings:  BP Readings from Last 3 Encounters:   06/19/17 (!) 125/93   06/05/17 (!) 128/98   04/27/17 131/85     Blood pressure: FAILED    HTN Guidelines:  Age 18-59 BP range:  Less than 140/90  Age 60-85 with Diabetes:  Less than 140/90  Age 60-85 without Diabetes:  less than 150/90        Composite cancer screening  Chart review shows that this patient is due/due soon for the following Colonoscopy and Fecal Colorectal (FIT)  Summary:    Patient is due/failing the following:   BP CHECK, COLONOSCOPY and FIT    Action needed:   Patient needs office visit for hypertension.    Type of outreach:    Sent letter.    Questions for provider review:    None                                                                                                                                    Ria Crespo CMA       Chart routed to close .

## 2017-08-18 NOTE — LETTER
[facility]  [address] [facility] [address]  Phone: [phone]  Fax: [phone]    08/18/17    Cheyanne Guardado  [address] [place] [address]      To whom it may concern:     Our records indicate that you are due for a appointment with  Dr Crandall for a hypertension recheck.       Monitoring and managing your preventative and chronic health conditions are very important to us.     If you have received your health care elsewhere, please provide us with that information so it can be documented in your chart.    Please call [phone] or message us through your [employer] account to schedule an appointment or provide information for your chart.     I look forward to seeing you and working with you on your health care needs.         Sincerely,       DEBRA Rollins   on behalf of   Ayanna Crandall MD

## 2017-09-06 NOTE — PROGRESS NOTES
"Health Psychology                                      Department of Medicine                                           [facility] Mail Code 741    Michelle Booker, Ph.D., L.P. [phone]  [address] [place] [address], SNICOLA Augusto Romero, Ph.D.,  L.P. [phone]  [place] [address]  Deon Pinzon, Ph.D., A.B.P.P., L.P. [phone]       Iza Padilla, PhD, LP [phone]  ________________________________________________________________________________________________  Health Psychology - Follow up Visit  Confidential Summary*    REFERRAL SOURCE  [facility] bariatric surgery    CHIEF COMPLAINT/REASON FOR VISIT  Cognitive behavioral therapy and behavioral counseling in context of health and behavior issues related to weight loss and upcoming weight loss surgery/gastric sleeve surgery.       Patient was seen today for a 60 minute individual health and behavior intervention session.    Subjective:  Patient and  seen for second conjoint session to increase understanding of the impact of their relationship on patients weight loss efforts and to provide education so that post surgery adjustment will not have a deleterious impact on their relationship.      In the last session, couple discussed their 20 year anniversary at the beginning of September and their failure to plan celebration.  Her  announced that he has planned a \"surprise\" vacation for them to celebrate. Patient and husbands interactions appear noticeably lighter and patient observed to increase her confronting of his controlling behaviors.  He described a history of concern about her mental health and making increasing efforts to take on more of the home duties to support her.  Confronted them that the situation has changed and that patient would like to resume a partner role in their relationship and home functioning.   was able to confront patient on her withdrawal and " nonadherence to medical recommendations which increase his fear of loosening his control efforts.  He also described the life he had with her while she was chronically overconsuming alcohol and the impact of this on his anxiety level.  Encouraged ALANON for .      Couple reported less tv watching and more dining together on deck.  Encouraged healthy meals and decreasing snacking in the evenings.  Continue to remind couple of dietary restrictions post surgery.    Encouraged the couple to consider the impact of her upcoming weight loss surgery and dietary changes on their home functioning.   reported a desire to lose weight and he is encouraged that they will increase their activity level and time in outside recreational pursuits.      Discussed the impact of their first grandchild on their life and the stress of preparing their home for another daughters wedding next summer.  Patient was able to ask  not to comment on her yard work and to micromanage her pursuits.  .      Objective:  Patient was on time for today s session, appropriately groomed and dressed, and demonstrated good eye contact.  She appeared friendly, alert and oriented.  Mood was dysphoric, with appropriate range of affect. Patient denied suicidal or assaultive ideation, plan, or intent.        Assessment:  The patient has a longstanding history of obesity and continues to report current main motivation for weight loss surgery as physical health concerns and her desire to be more physically active and able to interact with her upcoming grandchild.  She is preparing for weight loss surgery by addressing mood and marital challenges.      Plan:  Patient will be seen in 1 week, again with  to address ongoing marital challenges.    Time In:  4:00  Time Out:  5:00    Diagnosis:  Axis I Mdd, mild; psych factors impacting health, substance dependence in full sustained remission   Axis II Deferred   Axis III Obesity (278.00), please  see medical records for details   [place] IV Psychosocial and Environmental Stressors: Health & family struggles       Iza Padilla, Ph.D., L.P.      *In accordance with the Rules of the [place] Board of Psychology, it is noted that psychological descriptions and scientific procedures underlying psychological evaluations have limitations.  Absolute predictions cannot be made based on information in this report.

## 2017-09-13 ENCOUNTER — OFFICE VISIT (OUTPATIENT)
Dept: PSYCHOLOGY | Facility: CLINIC | Age: 57
End: 2017-09-13

## 2017-09-13 DIAGNOSIS — F33.0 MAJOR DEPRESSIVE DISORDER, RECURRENT EPISODE, MILD (H): Primary | ICD-10-CM

## 2017-09-13 DIAGNOSIS — F54 PSYCHOLOGICAL FACTORS AFFECTING MORBID OBESITY (H): ICD-10-CM

## 2017-09-13 DIAGNOSIS — E66.01 PSYCHOLOGICAL FACTORS AFFECTING MORBID OBESITY (H): ICD-10-CM

## 2017-09-13 DIAGNOSIS — F10.21 ALCOHOL DEPENDENCE IN SUSTAINED FULL REMISSION (H): ICD-10-CM

## 2017-09-13 NOTE — MR AVS SNAPSHOT
After Visit Summary   9/13/2017    Cheyanne Guardado    MRN: [id number]           Patient Information     Date Of Birth          1960        Visit Information        Provider Department      9/13/2017 4:00 PM Iza Padilla, PhD [facility]        Today's Diagnoses     Major depressive disorder, recurrent episode, mild (H)    -  1    Alcohol dependence in sustained full remission (H)        Psychological factors affecting morbid obesity (H)           Follow-ups after your visit        Your next 10 appointments already scheduled     Sep 20, 2017  4:00 PM CDT   (Arrive by 3:45 PM)   Return Visit with Iza Padilla, PhD BRIAN   [facility] ([facility])    [address] [address] [phone]              Who to contact     Please call your clinic at [phone] to:    Ask questions about your health    Make or cancel appointments    Discuss your medicines    Learn about your test results    Speak to your doctor   If you have compliments or concerns about an experience at your clinic, or if you wish to file a complaint, please contact [facility] Patient Relations at [phone] or email us at [email or web address]         Additional Information About Your Visit        MyChart Information     Event[employer]o gives you secure access to your electronic health record. If you see a primary care provider, you can also send messages to your care team and make appointments. If you have questions, please call your primary care clinic.  If you do not have a primary care provider, please call [phone] and they will assist you.      Eventdoo is an electronic gateway that provides easy, online access to your medical records. With Event[employer], you can request a clinic appointment, read your test results, renew a prescription or communicate with your care team.     To access your existing  account, please contact your [facility] or call [phone] for assistance.        Care EveryWhere ID     This is your Care EveryWhere ID. This could be used by other organizations to access your [facility] medical records  [id number]         Blood Pressure from Last 3 Encounters:   06/19/17 (!) 125/93   06/05/17 (!) 128/98   04/27/17 131/85    Weight from Last 3 Encounters:   06/19/17 92.1 kg (203 lb)   06/05/17 90.7 kg (200 lb)   04/27/17 92.9 kg (204 lb 12.8 oz)              Today, you had the following     No orders found for display       Primary Care Provider Office Phone # Fax #    Antwon Almazan [phone] [address] [facility] [address]        Equal Access to Services     [facility]: Hadii ti lozano hadasho Soomaali, waaxda luqadaha, qaybta kaalmada adeegyada, edward sanchez haykrystyna rizo . So [facility] [phone].    ATENCIÓN: Si habla español, tiene a hinojosa disposición servicios gratuitos de asistencia lingüística. Llame al [phone].    We comply with applicable federal civil rights laws and [place] laws. We do not discriminate on the basis of race, color, national origin, age, disability sex, sexual orientation or gender identity.            Thank you!     Thank you for choosing [facility]  for your care. Our goal is always to provide you with excellent care. Hearing back from our patients is one way we can continue to improve our services. Please take a few minutes to complete the written survey that you may receive in the mail after your visit with us. Thank you!             Your Updated Medication List - Protect others around you: Learn how to safely use, store and throw away your medicines at www.disposemymeds.org.          This list is accurate as of: 9/13/17 11:59 PM.  Always use your most recent med list.                   Brand Name Dispense Instructions for use Diagnosis    clonazePAM 0.5 MG  tablet    klonoPIN          hydrochlorothiazide 25 MG tablet    HYDRODIURIL    90 tablet    Take 1 tablet (25 mg) by mouth daily    Essential hypertension       losartan 100 MG tablet    COZAAR    90 tablet    Take 1 tablet (100 mg) by mouth daily    Essential hypertension       MODAFINIL PO      Take 100 mg by mouth daily        omeprazole 20 MG CR capsule    priLOSEC    14 capsule    Take 1 capsule (20 mg) by mouth daily    Gastroesophageal reflux disease, esophagitis presence not specified       PROZAC PO      Take 40 mg by mouth daily        ZYRTEC ALLERGY PO      Take  by mouth.

## 2017-09-15 ENCOUNTER — TRANSFERRED RECORDS (OUTPATIENT)
Dept: HEALTH INFORMATION MANAGEMENT | Facility: CLINIC | Age: 57
End: 2017-09-15
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
## 2017-09-20 ENCOUNTER — OFFICE VISIT (OUTPATIENT)
Dept: PSYCHOLOGY | Facility: CLINIC | Age: 57
End: 2017-09-20

## 2017-09-20 DIAGNOSIS — F33.0 MAJOR DEPRESSIVE DISORDER, RECURRENT EPISODE, MILD (H): Primary | ICD-10-CM

## 2017-09-20 DIAGNOSIS — F54 PSYCHOLOGICAL FACTORS AFFECTING MORBID OBESITY (H): ICD-10-CM

## 2017-09-20 DIAGNOSIS — E66.01 PSYCHOLOGICAL FACTORS AFFECTING MORBID OBESITY (H): ICD-10-CM

## 2017-09-20 DIAGNOSIS — F10.21 ALCOHOL DEPENDENCE IN SUSTAINED FULL REMISSION (H): ICD-10-CM

## 2017-09-20 NOTE — MR AVS SNAPSHOT
After Visit Summary   9/20/2017    Cheyanne Guardado    MRN: [id number]           Patient Information     Date Of Birth          1960        Visit Information        Provider Department      9/20/2017 4:00 PM Iza Padilla, PhD [facility]        Today's Diagnoses     Major depressive disorder, recurrent episode, mild (H)    -  1    Alcohol dependence in sustained full remission (H)        Psychological factors affecting morbid obesity (H)           Follow-ups after your visit        Your next 10 appointments already scheduled     Oct 04, 2017  4:00 PM CDT   (Arrive by 3:45 PM)   Return Visit with Iza Padilla, PhD BRIAN   [facility] ([facility])    [address] [address] [phone]              Who to contact     Please call your clinic at [phone] to:    Ask questions about your health    Make or cancel appointments    Discuss your medicines    Learn about your test results    Speak to your doctor   If you have compliments or concerns about an experience at your clinic, or if you wish to file a complaint, please contact [facility] Patient Relations at [phone] or email us at [email or web address]         Additional Information About Your Visit        MyChart Information     [employer] gives you secure access to your electronic health record. If you see a primary care provider, you can also send messages to your care team and make appointments. If you have questions, please call your primary care clinic.  If you do not have a primary care provider, please call [phone] and they will assist you.      Views[employer] is an electronic gateway that provides easy, online access to your medical records. With [employer], you can request a clinic appointment, read your test results, renew a prescription or communicate with your care team.     To access your existing  account, please contact your [facility] or call [phone] for assistance.        Care EveryWhere ID     This is your Care EveryWhere ID. This could be used by other organizations to access your [place] medical records  [id number]         Blood Pressure from Last 3 Encounters:   06/19/17 (!) 125/93   06/05/17 (!) 128/98   04/27/17 131/85    Weight from Last 3 Encounters:   06/19/17 92.1 kg (203 lb)   06/05/17 90.7 kg (200 lb)   04/27/17 92.9 kg (204 lb 12.8 oz)              Today, you had the following     No orders found for display       Primary Care Provider Office Phone # Fax #    Antwon Almazan [phone] [address] [facility] [address]        Equal Access to Services     [facility]: Hadii ti lozano hadasho Soomaali, waaxda luqadaha, qaybta kaalmada adeegyada, edward sanchez haykrystyna rizo . So [facility] [phone].    ATENCIÓN: Si habla español, tiene a hinojosa disposición servicios gratuitos de asistencia lingüística. Tadeoame al [phone].    We comply with applicable federal civil rights laws and [place] laws. We do not discriminate on the basis of race, color, national origin, age, disability, sex, sexual orientation, or gender identity.            Thank you!     Thank you for choosing [facility]  for your care. Our goal is always to provide you with excellent care. Hearing back from our patients is one way we can continue to improve our services. Please take a few minutes to complete the written survey that you may receive in the mail after your visit with us. Thank you!             Your Updated Medication List - Protect others around you: Learn how to safely use, store and throw away your medicines at www.disposemymeds.org.          This list is accurate as of: 9/20/17 11:59 PM.  Always use your most recent med list.                   Brand Name Dispense Instructions for use Diagnosis    clonazePAM 0.5 MG  tablet    klonoPIN          hydrochlorothiazide 25 MG tablet    HYDRODIURIL    90 tablet    Take 1 tablet (25 mg) by mouth daily    Essential hypertension       losartan 100 MG tablet    COZAAR    90 tablet    Take 1 tablet (100 mg) by mouth daily    Essential hypertension       MODAFINIL PO      Take 100 mg by mouth daily        omeprazole 20 MG CR capsule    priLOSEC    14 capsule    Take 1 capsule (20 mg) by mouth daily    Gastroesophageal reflux disease, esophagitis presence not specified       PROZAC PO      Take 40 mg by mouth daily        ZYRTEC ALLERGY PO      Take  by mouth.

## 2017-09-29 NOTE — PROGRESS NOTES
Health Psychology                                      Department of Medicine                                           [facility] Mail Code 741    Michelle Booker, Ph.D., L.P. [phone]  [address] [place] [address], [facility] Augusto Romero, Ph.D.,  L.P. [phone]  [place] [address]  Deon Pinzon, Ph.D., A.B.P.P., L.P. [phone]       Iza Padilla, PhD, LP [phone]  ________________________________________________________________________________________________  Health Psychology - Follow up Visit  Confidential Summary*    REFERRAL SOURCE  [facility] bariatric surgery    CHIEF COMPLAINT/REASON FOR VISIT  Cognitive behavioral therapy and behavioral counseling in context of health and behavior issues related to weight loss and upcoming weight loss surgery/gastric sleeve surgery.       Patient was seen today for a 60 minute individual health and behavior intervention session.    Subjective:  Patient seen alone for session today.  She anticipated that her  was coming but he did had to stay late at his job.      She weighed in at 205, up 5 pounds from last assessment.  Discussed her disappointment because she fasted for two days prior to her colonoscopy.  She reported that she did not complete the prep as she was prescribed and had some concern that they were not going to perform the procedure but they did in the end.  She reported that it was difficult for her not to eat      She reported that her mood is much improved and overall, she reported better use of DBT tools.  However, she was not able to detail skills she is using. She is questioning whether a BPD is accurate for her.  She reported some frustration that her  continues to view her through the lens of her psychiatric diagnosis.      She reported better adherence to CPAP and improved sleep.      She is considering quitting her job in order to provide  to her new  grandchild, at least temporarily.  He is one hour away, in [place] so she is not seeing him as much as she had hoped.      She reported improved blood pressure management.      Objective:  Patient was on time for today s session, appropriately groomed and dressed, and demonstrated good eye contact.  She appeared friendly, alert and oriented.  Mood was dysphoric, with appropriate range of affect. Patient denied suicidal or assaultive ideation, plan, or intent.        Assessment:  The patient has a longstanding history of obesity and continues to report current main motivation for weight loss surgery as physical health concerns and her desire to be more physically active and able to interact with her grandchild.  She is preparing for weight loss surgery by addressing mood and marital challenges.      Plan:  Patient will be seen in 1 week, again with  to address ongoing marital challenges.    Time In:  4:00  Time Out:  5:00    Diagnosis:  Axis I Mdd, mild; psych factors impacting health, substance dependence in full sustained remission   Axis II Deferred   Axis III Obesity (278.00), please see medical records for details   [place] IV Psychosocial and Environmental Stressors: Health & family struggles       Iza Padilla, Ph.D., L.P.      *In accordance with the Rules of the [place] Board of Psychology, it is noted that psychological descriptions and scientific procedures underlying psychological evaluations have limitations.  Absolute predictions cannot be made based on information in this report.

## 2017-10-04 ENCOUNTER — OFFICE VISIT (OUTPATIENT)
Dept: PSYCHOLOGY | Facility: CLINIC | Age: 57
End: 2017-10-04

## 2017-10-04 DIAGNOSIS — E66.01 PSYCHOLOGICAL FACTORS AFFECTING MORBID OBESITY (H): ICD-10-CM

## 2017-10-04 DIAGNOSIS — F54 PSYCHOLOGICAL FACTORS AFFECTING MORBID OBESITY (H): ICD-10-CM

## 2017-10-04 DIAGNOSIS — F10.21 ALCOHOL DEPENDENCE IN SUSTAINED FULL REMISSION (H): ICD-10-CM

## 2017-10-04 DIAGNOSIS — F33.0 MAJOR DEPRESSIVE DISORDER, RECURRENT EPISODE, MILD (H): Primary | ICD-10-CM

## 2017-10-04 NOTE — MR AVS SNAPSHOT
After Visit Summary   10/4/2017    Cheyanne Guardado    MRN: [id number]           Patient Information     Date Of Birth          1960        Visit Information        Provider Department      10/4/2017 4:00 PM Iza Padilla, PhD [facility]        Today's Diagnoses     Major depressive disorder, recurrent episode, mild (H)    -  1    Alcohol dependence in sustained full remission (H)        Psychological factors affecting morbid obesity (H)           Follow-ups after your visit        Who to contact     Please call your clinic at [phone] to:    Ask questions about your health    Make or cancel appointments    Discuss your medicines    Learn about your test results    Speak to your doctor   If you have compliments or concerns about an experience at your clinic, or if you wish to file a complaint, please contact [facility] Patient Relations at [phone] or email us at [email or web address]         Additional Information About Your Visit        MyChart Information     [employer] gives you secure access to your electronic health record. If you see a primary care provider, you can also send messages to your care team and make appointments. If you have questions, please call your primary care clinic.  If you do not have a primary care provider, please call [phone] and they will assist you.      [employer] is an electronic gateway that provides easy, online access to your medical records. With [employer], you can request a clinic appointment, read your test results, renew a prescription or communicate with your care team.     To access your existing account, please contact your [facility] or call [phone] for assistance.        Care EveryWhere ID     This is your Care EveryWhere ID. This could be used by other organizations to access your [place] medical records  [id number]         Blood Pressure from Last 3  Encounters:   06/19/17 (!) 125/93   06/05/17 (!) 128/98   04/27/17 131/85    Weight from Last 3 Encounters:   06/19/17 92.1 kg (203 lb)   06/05/17 90.7 kg (200 lb)   04/27/17 92.9 kg (204 lb 12.8 oz)              Today, you had the following     No orders found for display       Primary Care Provider Office Phone # Fax #    Antwon Almazan [phone] [address] [facility] [address]        Equal Access to Services     [facility]: Hadii ti ku hadasho Soomaali, waaxda luqadaha, qaybta kaalmada adeyoanayada, edward rizo . So [facility] [phone].    ATENCIÓN: Si habla español, tiene a hinojosa disposición servicios gratuitos de asistencia lingüística. Llame al [phone].    We comply with applicable federal civil rights laws and [place] laws. We do not discriminate on the basis of race, color, national origin, age, disability, sex, sexual orientation, or gender identity.            Thank you!     Thank you for choosing [facility]  for your care. Our goal is always to provide you with excellent care. Hearing back from our patients is one way we can continue to improve our services. Please take a few minutes to complete the written survey that you may receive in the mail after your visit with us. Thank you!             Your Updated Medication List - Protect others around you: Learn how to safely use, store and throw away your medicines at www.disposemymeds.org.          This list is accurate as of: 10/4/17 11:59 PM.  Always use your most recent med list.                   Brand Name Dispense Instructions for use Diagnosis    clonazePAM 0.5 MG tablet    klonoPIN          hydrochlorothiazide 25 MG tablet    HYDRODIURIL    90 tablet    Take 1 tablet (25 mg) by mouth daily    Essential hypertension       losartan 100 MG tablet    COZAAR    90 tablet    Take 1 tablet (100 mg) by mouth daily    Essential hypertension       MODAFINIL PO      Take  100 mg by mouth daily        omeprazole 20 MG CR capsule    priLOSEC    14 capsule    Take 1 capsule (20 mg) by mouth daily    Gastroesophageal reflux disease, esophagitis presence not specified       PROZAC PO      Take 40 mg by mouth daily        ZYRTEC ALLERGY PO      Take  by mouth.

## 2017-10-10 NOTE — PROGRESS NOTES
Health Psychology                                      Department of Medicine                                           [facility] Mail Code 741    Michelle Booker, Ph.D., L.P. [phone]  [address] [place] [address], [facility] Augusto Romero, Ph.D.,  L.P. [phone]  [place] [address]  Deon Pinzon, Ph.D., A.B.P.P., L.P. [phone]       Iza Padilla, PhD, LP [phone]  ________________________________________________________________________________________________  Health Psychology - Follow up Visit  Confidential Summary*    REFERRAL SOURCE  [facility] bariatric surgery    CHIEF COMPLAINT/REASON FOR VISIT  Cognitive behavioral therapy and behavioral counseling in context of health and behavior issues related to weight loss and upcoming weight loss surgery/gastric sleeve surgery.       Patient was seen today for a 60 minute individual health and behavior intervention session.  Patient initially called to cancel this appointment stating that she has not made progress in returning to monitoring her dietary intake.  However, we discussed the importance of receiving assistance in addressing her barriers.  We agreed that future sessions would be on an as needed basis.      Subjective:  Patient seen alone for session today.      She declined interest in weighing in, reporting that she is concerned that she is continuing to gain weight.  Discussed her motivation for weight loss and reviewed her interest in pursuing bariatric surgery.     Reviewed the recent re-evaluation conducted by Dr. Ibarra at [place] and pointed out that the patients progress toward weight loss surgery will need to include increasing her stress coping skills, continuing to pursue psychiatric stabilization and continue alcohol treatment and sustained sobriety.  The patient has been successful in each of these areas.      However, her mood continues to be stable and she has  discontinued all medication.  She reported that she believes that her chronic poor sleep was having a negative impact on her mood and she believes that she is feeling more rested with regular use of the CPAP.  She also reported that she will be reevaluated for sleep on November 7.  Encoruaged her to contact her provider to discuss her medication regimen to be sure that she is adherent.  Discussed the importance of adherence when pursuing bariatric surgery.      She continues to describe feeling tired and out of breath and has some concern that her pulmonary condition may be worsening.     She denies any consistent physical activity and is aware of the negative impact that her weight has on her lung functioning.  She is concerned that her blood pressure medication may be contributing to her feelings of lethargy.  She reported that she has noticed that she eats in response to feeling fatigues and believes that she is hoping food will give her energy.  Discussed increasing activity to determine if this might increase her energy.      She committed to walking at work on 2 occasions this week, 10 minutes each time.  She also committed to doing something fun with a friend on one occasion.  She reported improved relations with her  and better coping with grief surrounding losing her family of origin.      She reported that she continues to enjoy her new grandson and although she considered taking a leave from work to help care for him, she is not going to pursue this option for fear of losing her job.      Objective:  Patient was on time for today s session, appropriately groomed and dressed, and demonstrated good eye contact.  She appeared friendly, alert and oriented.  Mood was euthymic with appropriate range of affect. Patient denied suicidal or assaultive ideation, plan, or intent.        Assessment:  The patient has a longstanding history of obesity and continues to report current main motivation for weight  loss surgery as physical health concerns and her desire to be more physically active and able to interact with her grandchild.  She is preparing for weight loss surgery by addressing mood and marital challenges.      Plan:  Patient will be seen in 1 week, again with  to address ongoing marital challenges.    Time In:  4:00  Time Out:  5:00    Diagnosis:  Axis I Mdd, mild; psych factors impacting health, substance dependence in full sustained remission   Axis II Deferred   Axis III Obesity (278.00), please see medical records for details   [place] IV Psychosocial and Environmental Stressors: Health & family struggles       Iza Padilla, Ph.D., L.P.      *In accordance with the Rules of the [place] Board of Psychology, it is noted that psychological descriptions and scientific procedures underlying psychological evaluations have limitations.  Absolute predictions cannot be made based on information in this report.

## 2017-10-15 ENCOUNTER — HEALTH MAINTENANCE LETTER (OUTPATIENT)
Age: 57
End: 2017-10-15

## 2017-11-08 ENCOUNTER — OFFICE VISIT (OUTPATIENT)
Dept: PSYCHOLOGY | Facility: CLINIC | Age: 57
End: 2017-11-08

## 2017-11-08 DIAGNOSIS — F33.0 MAJOR DEPRESSIVE DISORDER, RECURRENT EPISODE, MILD (H): Primary | ICD-10-CM

## 2017-11-08 DIAGNOSIS — E66.01 PSYCHOLOGICAL FACTORS AFFECTING MORBID OBESITY (H): ICD-10-CM

## 2017-11-08 DIAGNOSIS — F10.21 ALCOHOL DEPENDENCE IN SUSTAINED FULL REMISSION (H): ICD-10-CM

## 2017-11-08 DIAGNOSIS — F54 PSYCHOLOGICAL FACTORS AFFECTING MORBID OBESITY (H): ICD-10-CM

## 2017-11-08 NOTE — MR AVS SNAPSHOT
After Visit Summary   11/8/2017    Cheyanne Guardado    MRN: [id number]           Patient Information     Date Of Birth          1960        Visit Information        Provider Department      11/8/2017 4:00 PM Iza Padilla, PhD [facility]        Today's Diagnoses     Major depressive disorder, recurrent episode, mild (H)    -  1    Alcohol dependence in sustained full remission (H)        Psychological factors affecting morbid obesity (H)           Follow-ups after your visit        Your next 10 appointments already scheduled     Nov 29, 2017  4:00 PM CST   (Arrive by 3:45 PM)   Return Visit with Iza Padilla, PhD BRIAN   [facility] ([facility] [place])    [address] [address] [phone]              Who to contact     Please call your clinic at [phone] to:    Ask questions about your health    Make or cancel appointments    Discuss your medicines    Learn about your test results    Speak to your doctor   If you have compliments or concerns about an experience at your clinic, or if you wish to file a complaint, please contact [facility] Patient Relations at [phone] or email us at [email or web address]         Additional Information About Your Visit        MyChart Information     [employer] gives you secure access to your electronic health record. If you see a primary care provider, you can also send messages to your care team and make appointments. If you have questions, please call your primary care clinic.  If you do not have a primary care provider, please call [phone] and they will assist you.      [employer] is an electronic gateway that provides easy, online access to your medical records. With [employer], you can request a clinic appointment, read your test results, renew a prescription or communicate with your care team.     To access your existing  account, please contact your [facility] or call [phone] for assistance.        Care EveryWhere ID     This is your Care EveryWhere ID. This could be used by other organizations to access your [place] medical records  [id number]         Blood Pressure from Last 3 Encounters:   11/25/17 (!) 136/97   06/19/17 (!) 125/93   06/05/17 (!) 128/98    Weight from Last 3 Encounters:   11/25/17 91.8 kg (202 lb 6.4 oz)   06/19/17 92.1 kg (203 lb)   06/05/17 90.7 kg (200 lb)              Today, you had the following     No orders found for display       Primary Care Provider Office Phone # Fax #    Antwon Almazan [phone] [address] [facility] [address]        Equal Access to Services     [facility]: Hadii aad ku hadasho Soomaali, waaxda luqadaha, qaybta kaalmada adeegyada, waxay idiin hayaan lisa rizo . So [facility] [place] [phone].    ATENCIÓN: Si habla español, tiene a hinojosa disposición servicios gratuitos de asistencia lingüística. Llame al [phone].    We comply with applicable federal civil rights laws and [place] laws. We do not discriminate on the basis of race, color, national origin, age, disability, sex, sexual orientation, or gender identity.            Thank you!     Thank you for choosing [facility]  for your care. Our goal is always to provide you with excellent care. Hearing back from our patients is one way we can continue to improve our services. Please take a few minutes to complete the written survey that you may receive in the mail after your visit with us. Thank you!             Your Updated Medication List - Protect others around you: Learn how to safely use, store and throw away your medicines at www.disposemymeds.org.          This list is accurate as of: 11/8/17 11:59 PM.  Always use your most recent med list.                   Brand Name Dispense Instructions for use Diagnosis    clonazePAM 0.5 MG  tablet    klonoPIN          hydrochlorothiazide 25 MG tablet    HYDRODIURIL    90 tablet    Take 1 tablet (25 mg) by mouth daily    Essential hypertension       losartan 100 MG tablet    COZAAR    90 tablet    Take 1 tablet (100 mg) by mouth daily    Essential hypertension       MODAFINIL PO      Take 100 mg by mouth daily        omeprazole 20 MG CR capsule    priLOSEC    14 capsule    Take 1 capsule (20 mg) by mouth daily    Gastroesophageal reflux disease, esophagitis presence not specified       ZYRTEC ALLERGY PO      Take  by mouth.

## 2017-11-25 ENCOUNTER — OFFICE VISIT (OUTPATIENT)
Dept: URGENT CARE | Facility: URGENT CARE | Age: 57
End: 2017-11-25
Payer: COMMERCIAL

## 2017-11-25 VITALS
BODY MASS INDEX: 40.88 KG/M2 | TEMPERATURE: 97.6 F | DIASTOLIC BLOOD PRESSURE: 97 MMHG | OXYGEN SATURATION: 96 % | HEART RATE: 83 BPM | SYSTOLIC BLOOD PRESSURE: 136 MMHG | WEIGHT: 202.4 LBS

## 2017-11-25 DIAGNOSIS — D3A.00 CARCINOID TUMOR (H): ICD-10-CM

## 2017-11-25 DIAGNOSIS — Z90.2 S/P PNEUMONECTOMY: ICD-10-CM

## 2017-11-25 DIAGNOSIS — I10 BENIGN ESSENTIAL HYPERTENSION: ICD-10-CM

## 2017-11-25 DIAGNOSIS — J20.9 ACUTE BRONCHITIS WITH COEXISTING CONDITION REQUIRING PROPHYLACTIC TREATMENT: Primary | ICD-10-CM

## 2017-11-25 DIAGNOSIS — R07.0 THROAT PAIN: ICD-10-CM

## 2017-11-25 LAB
DEPRECATED S PYO AG THROAT QL EIA: NORMAL
SPECIMEN SOURCE: NORMAL

## 2017-11-25 PROCEDURE — 87081 CULTURE SCREEN ONLY: CPT | Performed by: FAMILY MEDICINE

## 2017-11-25 PROCEDURE — 99214 OFFICE O/P EST MOD 30 MIN: CPT | Performed by: FAMILY MEDICINE

## 2017-11-25 PROCEDURE — 87880 STREP A ASSAY W/OPTIC: CPT | Performed by: FAMILY MEDICINE

## 2017-11-25 RX ORDER — AZITHROMYCIN 250 MG/1
TABLET, FILM COATED ORAL
Qty: 6 TABLET | Refills: 0 | Status: SHIPPED | OUTPATIENT
Start: 2017-11-25 | End: 2018-01-09

## 2017-11-25 NOTE — PROGRESS NOTES
SUBJECTIVE:                                                    Cheyanne Guardado is a 57 year old female who presents to clinic today for the following health issues:      ENT Symptoms             Symptoms: cc Present Absent Comment   Fever/Chills   x    Fatigue  x     Muscle Aches   x    Eye Irritation   x    Sneezing   x    Nasal Anatoly/Drg  x     Sinus Pressure/Pain   x    Loss of smell   x    Dental pain   x    Sore Throat  x     Swollen Glands   x    Ear Pain/Fullness   x    Cough x   Patient has 1 lung   Wheeze   x    Chest Pain   x    Shortness of breath  x     Rash   x    Other   x      Symptom duration:  2 days   Symptom severity:  moderate   Treatments tried:  Ibuprofen   Contacts:  None     No thoughts of harming self or others    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  Then woke up got worse  All day yesterday the throat felt like it's on fire.  Last night starting to move down her chest.  Wheezing: No  Chest pain or exertional shortness of breath: NO   Exposure to pertussis or pertussis like symptoms: No  Orthopnea, worsening edema, pnd: NO  Rash: NO  Tried OTC medications without relief  No hemoptysis.  Worsening symptoms hence patient came in to be seen     BP elevated today but asymptomatic. No headache no blurring of vision no chest pain no shortness of breath no dizziness no unsteadiness no numbness no weakness      Problem list and histories reviewed & adjusted, as indicated.  Additional history: as documented    Problem list, Medication list, Allergies, and Medical/Social/Surgical histories reviewed in EPIC and updated as appropriate.    ROS:  Constitutional, HEENT, cardiovascular, pulmonary, gi and gu systems are negative, except as otherwise noted.    OBJECTIVE:                                                    BP (!) 136/97  Pulse 83  Temp 97.6  F (36.4  C) (Tympanic)  Wt 202 lb 6.4 oz (91.8 kg)  SpO2 96%  BMI 40.88 kg/m2  Body  mass index is 40.88 kg/(m^2).  GENERAL: healthy, alert and no distress  EYES: pink palpebral conjunctiva, anicteric sclera  ENT: midline nasal septum normal ear exam. congested sinuses.   Mouth: moist buccal mucosa positive hyperemic posterior pharyngeal wall. No tonsillar enlargement or cellulitis  NECK: no adenopathy, no asymmetry, masses, or scars and thyroid normal to palpation  RESP: lungs clear to auscultation - No  rales, rhonchi or wheezes    CV: regular rate and rhythm, normal S1 S2, no S3 or S4,  No murmurs, click or rub  SKIN: no visible rashes noted  Pscyh: Appropriate mood and affect  MS: no gross musculoskeletal defects noted    Diagnostic Test Results:  Results for orders placed or performed in visit on 11/25/17 (from the past 24 hour(s))   Rapid strep screen   Result Value Ref Range    Specimen Description Throat     Rapid Strep A Screen       NEGATIVE: No Group A streptococcal antigen detected by immunoassay, await culture report.        ASSESSMENT/PLAN:                                                        ICD-10-CM    1. Acute bronchitis with coexisting condition requiring prophylactic treatment J20.9 azithromycin (ZITHROMAX) 250 MG tablet   2. Throat pain R07.0 Rapid strep screen     Beta strep group A culture   3. Benign essential hypertension I10    4. Carcinoid tumor D3A.00    5. S/P pneumonectomy Z90.2      Recommend trial supportive treatment  Given symptoms are mild and just started  However given patient risk factors. She would like to have the antibiotic on hand in case symptoms persist or worsen  Patient given a prescription for zithromax. Side effects of antibiotic discussed. Aware of small but statistically significant increase cardiovascular risk with antibiotic.  Adverse reactions of medications discussed.  Over the counter medications discussed.   Aware to come back in if with worsening symptoms or if no relief despite treatment plan  Patient voiced understanding and had no further  questions.     Follow up with primary care provider for BP - patient not taking her meds. Diet control only and she states 120's/80's at home.  No symptoms  Recommend that you have it re-checked either at home or at our clinic within a week  Avoid cold medicines that have pseudoephedrin in it, or Sudafed. You may take regular or plain Robitussin or Mucinex  If you are unsure, please ask the pharmacist    If your blood pressure top number (systolic) 180 and above, or the bottom number (diastolic) 120 and above, you need to be seen immediately.  If persistently elevated top number 140 and above, or the bottom number 90 and above, please schedule an appointment to see a provider in clinic within a week.  If you have very elevated blood pressures, accompanied by headache, chest pain, numbness, weakness, slurring of speech, confusion, difficulty walking, call 911 and go to the ER    Tricia Emanuel MD  [facility]

## 2017-11-25 NOTE — MR AVS SNAPSHOT
After Visit Summary   11/25/2017    Cheyanne Guardado    MRN: [id number]           Patient Information     Date Of Birth          1960        Visit Information        Provider Department      11/25/2017 9:55 AM Tricia Emanuel MD [facility]        Today's Diagnoses     Acute bronchitis with coexisting condition requiring prophylactic treatment    -  1    Throat pain        Benign essential hypertension        Carcinoid tumor        S/P pneumonectomy           Follow-ups after your visit        Your next 10 appointments already scheduled     Nov 29, 2017  4:00 PM CST   (Arrive by 3:45 PM)   Return Visit with Iza Padilla, PhD [facility] ([facility] [place])    [address]  3rd Floor  [facility] [address] [phone]              Who to contact     If you have questions or need follow up information about today's clinic visit or your schedule please contact [facility] directly at [phone].  Normal or non-critical lab and imaging results will be communicated to you by MyChart, letter or phone within 4 business days after the clinic has received the results. If you do not hear from us within 7 days, please contact the clinic through [employer] or phone. If you have a critical or abnormal lab result, we will notify you by phone as soon as possible.  Submit refill requests through [employer] or call your pharmacy and they will forward the refill request to us. Please allow 3 business days for your refill to be completed.          Additional Information About Your Visit        [employer] Information     [employer] gives you secure access to your electronic health record. If you see a primary care provider, you can also send messages to your care team and make appointments. If you have questions, please call your primary care clinic.  If you do not have a primary care provider, please call [phone] and they will  assist you.        Care EveryWhere ID     This is your Care EveryWhere ID. This could be used by other organizations to access your [place] medical records  [id number]        Your Vitals Were     Pulse Temperature Pulse Oximetry BMI (Body Mass Index)          83 97.6  F (36.4  C) (Tympanic) 96% 40.88 kg/m2         Blood Pressure from Last 3 Encounters:   11/25/17 (!) 136/97   06/19/17 (!) 125/93   06/05/17 (!) 128/98    Weight from Last 3 Encounters:   11/25/17 202 lb 6.4 oz (91.8 kg)   06/19/17 203 lb (92.1 kg)   06/05/17 200 lb (90.7 kg)              We Performed the Following     Beta strep group A culture     Rapid strep screen          Today's Medication Changes          These changes are accurate as of: 11/25/17 11:49 AM.  If you have any questions, ask your nurse or doctor.               Start taking these medicines.        Dose/Directions    azithromycin 250 MG tablet   Commonly known as:  ZITHROMAX   Used for:  Acute bronchitis with coexisting condition requiring prophylactic treatment        2 tablets the first day, then 1 tablet daily for the next 4 days   Quantity:  6 tablet   Refills:  0            Where to get your medicines      These medications were sent to [facility] [address], [facility] [address]  [address] [place] [address]     Phone:  [phone]     azithromycin 250 MG tablet                Primary Care Provider Office Phone # Fax #    Antwon Almazan [phone] [address] [facility] [address]        Equal Access to Services     [facility] CAR[facility] : Hadii ti samuelo Sozain, waaxda luqadaha, qaybta kaalmada edward amador. So [facility] [phone].    ATENCIÓN: Si habla español, tiene a hinojosa disposición servicios gratuitos de asistencia lingüística. Llame al [phone].    We comply with applicable federal civil rights laws and [place] laws. We do not discriminate on the  basis of race, color, national origin, age, disability, sex, sexual orientation, or gender identity.            Thank you!     Thank you for choosing [facility]  [facility] your [facility]. Our goal is always to provide you with excellent care. Hearing back from our patients is one way we can continue to improve our services. Please take a few minutes to complete the written survey that you may receive in the mail after your visit with us. Thank you!             Your Updated Medication List - Protect others around you: Learn how to safely use, store and throw away your medicines at www.disposemymeds.org.          This list is accurate as of: 11/25/17 11:49 AM.  Always use your most recent med list.                   Brand Name Dispense Instructions for use Diagnosis    azithromycin 250 MG tablet    ZITHROMAX    6 tablet    2 tablets the first day, then 1 tablet daily for the next 4 days    Acute bronchitis with coexisting condition requiring prophylactic treatment       clonazePAM 0.5 MG tablet    klonoPIN          hydrochlorothiazide 25 MG tablet    HYDRODIURIL    90 tablet    Take 1 tablet (25 mg) by mouth daily    Essential hypertension       losartan 100 MG tablet    COZAAR    90 tablet    Take 1 tablet (100 mg) by mouth daily    Essential hypertension       MODAFINIL PO      Take 100 mg by mouth daily        omeprazole 20 MG CR capsule    priLOSEC    14 capsule    Take 1 capsule (20 mg) by mouth daily    Gastroesophageal reflux disease, esophagitis presence not specified       ZYRTEC ALLERGY PO      Take  by mouth.

## 2017-11-26 LAB
BACTERIA SPEC CULT: NORMAL
SPECIMEN SOURCE: NORMAL

## 2017-11-26 NOTE — PROGRESS NOTES
Health Psychology                                      Department of Medicine                                           [facility] Mail Code 741    Michelle Booker, Ph.D., L.P. [phone]  [address] [place] [address], [facility] Augusto Romero, Ph.D.,  L.P. [phone]  [place] [address]  Deon Pinzon, Ph.D., A.B.P.P., L.P. [phone]       Iza Padilla, PhD, LP [phone]  ________________________________________________________________________________________________  Health Psychology - Follow up Visit  Confidential Summary*    REFERRAL SOURCE  [facility] bariatric surgery    CHIEF COMPLAINT/REASON FOR VISIT  Cognitive behavioral therapy and behavioral counseling in context of health and behavior issues related to weight loss and upcoming weight loss surgery/gastric sleeve surgery.   Much of session content has been focused on depression, family of origin challenges, and alcohol dependence history.      Patient was seen today for a 60 minute individual health and behavior intervention session.      Subjective:  WEIGHT TODAY .  Patient seen alone for session today.  She began with report that she continues to be frustrated with her inability to lose weight or to change her dietary habits.  Discussed the recommendation from [facility] that she maintain or lose weight in order to qualify for surgery but she is not clear what her target weight is.  Discussed her dietary intake this past week and she reported that she had eggs, cottage cheese, apple, pretzel chips and hummus.  She appears to understand portion sizes and had caloric value of food recorded.  Discussed her use of tofu noodles and ground turkey in her tomato sauce.  Encouraged her to target 1500 per day to lose one pound per week.      She reported improved relations with her .      She has applied for a different position at her employment in order that she may be in the office  more and spend less time at [employer] which puts her efforts at stimulus control at jeopardy.      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    Objective:  Patient was on time for today s session, appropriately groomed and dressed, and demonstrated good eye contact.  She appeared friendly, alert and oriented.  Mood was euthymic with appropriate range of affect. Patient denied suicidal or assaultive ideation, plan, or intent.        Assessment:  The patient has a longstanding history of obesity and continues to report current main motivation for weight loss surgery as physical health concerns and her desire to be more physically active and able to interact with her grandchild.  She is preparing for weight loss surgery by addressing mood and marital challenges.      Plan:  Patient will be seen in 3 weeks.  Encouraged self monitoring and daily weigh in.    Time In:  4:00  Time Out:  5:00    Diagnosis:  Axis I Mdd, mild; psych factors impacting health, substance dependence in full sustained remission   Axis II Deferred   Axis III Obesity (278.00), please see medical records for details   [facility] IV Psychosocial and Environmental Stressors: Health & family struggles       Iza Padilla, Ph.D., L.P.      *In accordance with the Rules of the [place] Board of [employer], it is noted that psychological descriptions and scientific procedures underlying psychological evaluations have limitations.  Absolute predictions cannot be made based on information in this report.

## 2017-11-29 ENCOUNTER — OFFICE VISIT (OUTPATIENT)
Dept: PSYCHOLOGY | Facility: CLINIC | Age: 57
End: 2017-11-29

## 2017-11-29 DIAGNOSIS — E66.01 PSYCHOLOGICAL FACTORS AFFECTING MORBID OBESITY (H): ICD-10-CM

## 2017-11-29 DIAGNOSIS — F54 PSYCHOLOGICAL FACTORS AFFECTING MORBID OBESITY (H): ICD-10-CM

## 2017-11-29 DIAGNOSIS — F33.0 MAJOR DEPRESSIVE DISORDER, RECURRENT EPISODE, MILD (H): ICD-10-CM

## 2017-11-29 DIAGNOSIS — F10.21 ALCOHOL DEPENDENCE IN SUSTAINED FULL REMISSION (H): Primary | ICD-10-CM

## 2017-11-29 NOTE — MR AVS SNAPSHOT
After Visit Summary   11/29/2017    Cheyanne Guardado    MRN: [id number]           Patient Information     Date Of Birth          1960        Visit Information        Provider Department      11/29/2017 4:00 PM Iza Padilla, PhD [facility]        Today's Diagnoses     Alcohol dependence in sustained full remission (H)    -  1    Major depressive disorder, recurrent episode, mild (H)        Psychological factors affecting morbid obesity (H)           Follow-ups after your visit        Your next 10 appointments already scheduled     Dec 13, 2017  4:00 PM CST   (Arrive by 3:45 PM)   Return Visit with Iza Padilla, PhD BRIAN   [facility] ([facility] [place])    [address] [address] [phone]              Who to contact     Please call your clinic at [phone] to:    Ask questions about your health    Make or cancel appointments    Discuss your medicines    Learn about your test results    Speak to your doctor   If you have compliments or concerns about an experience at your clinic, or if you wish to file a complaint, please contact [facility] Patient Relations at [phone] or email us at [email or web address]         Additional Information About Your Visit        MyChart Information     [email or web address] gives you secure access to your electronic health record. If you see a primary care provider, you can also send messages to your care team and make appointments. If you have questions, please call your primary care clinic.  If you do not have a primary care provider, please call [phone] and they will assist you.      [email or web address] is an electronic gateway that provides easy, online access to your medical records. With [email or web address], you can request a clinic appointment, read your test results, renew a prescription or communicate with your care team.     To access your existing  account, please contact your [facility] or call [phone] for assistance.        Care EveryWhere ID     This is your Care EveryWhere ID. This could be used by other organizations to access your [facility] medical records  [id number]         Blood Pressure from Last 3 Encounters:   11/25/17 (!) 136/97   06/19/17 (!) 125/93   06/05/17 (!) 128/98    Weight from Last 3 Encounters:   11/25/17 91.8 kg (202 lb 6.4 oz)   06/19/17 92.1 kg (203 lb)   06/05/17 90.7 kg (200 lb)              Today, you had the following     No orders found for display       Primary Care Provider Office Phone # Fax #    Antwon Almazan [phone] [address] [facility] [address]        Equal Access to Services     [facility]: Hadii aad ku hadasho Soomaali, waaxda luqadaha, qaybta kaalmada adeegyada, waxay idiin hayaan lisa rizo . So [facility] [phone].    ATENCIÓN: Si habla español, tiene a hinojosa disposición servicios gratuitos de asistencia lingüística. Llame al [phone].    We comply with applicable federal civil rights laws and [place] laws. We do not discriminate on the basis of race, color, national origin, age, disability, sex, sexual orientation, or gender identity.            Thank you!     Thank you for choosing [facility]  for your care. Our goal is always to provide you with excellent care. Hearing back from our patients is one way we can continue to improve our services. Please take a few minutes to complete the written survey that you may receive in the mail after your visit with us. Thank you!             Your Updated Medication List - Protect others around you: Learn how to safely use, store and throw away your medicines at www.disposemymeds.org.          This list is accurate as of: 11/29/17 11:59 PM.  Always use your most recent med list.                   Brand Name Dispense Instructions for use Diagnosis    azithromycin 250  MG tablet    ZITHROMAX    6 tablet    2 tablets the first day, then 1 tablet daily for the next 4 days    Acute bronchitis with coexisting condition requiring prophylactic treatment       clonazePAM 0.5 MG tablet    klonoPIN          hydrochlorothiazide 25 MG tablet    HYDRODIURIL    90 tablet    Take 1 tablet (25 mg) by mouth daily    Essential hypertension       losartan 100 MG tablet    COZAAR    90 tablet    Take 1 tablet (100 mg) by mouth daily    Essential hypertension       MODAFINIL PO      Take 100 mg by mouth daily        omeprazole 20 MG CR capsule    priLOSEC    14 capsule    Take 1 capsule (20 mg) by mouth daily    Gastroesophageal reflux disease, esophagitis presence not specified       ZYRTEC ALLERGY PO      Take  by mouth.

## 2017-12-11 NOTE — PROGRESS NOTES
Health Psychology                                      Department of Medicine                                           [facility] Mail Code 741    Michelle Booker, Ph.D., L.P. [phone]  [address] [place] [address], [facility] [place] Romero, Ph.D.,  L.P. [phone]  [place] [address]  Deon Pinzon, Ph.D., A.B.P.P., L.P. [phone]       Iza Padilla, PhD, LP [phone]  ________________________________________________________________________________________________  Health Psychology - Follow up Visit  Confidential Summary*    REFERRAL SOURCE  [facility] bariatric surgery    CHIEF COMPLAINT/REASON FOR VISIT  Cognitive behavioral therapy and behavioral counseling in context of health and behavior issues related to weight loss and upcoming weight loss surgery/gastric sleeve surgery.       Patient was seen today for a 60 minute individual health and behavior intervention session.      Subjective:  WEIGHT TODAY .  She is down 7 pounds.  She began with report that she is following a ketogenic diet and is finding it an easy way to stay focused and her mood and energy are good.  She detailed being up until midnight using a new vacuum at home.  She is focusing on keeping her daily carbohydrate limit at 20 or fewer.  She reported making several health behavior changes including discontinuing sweetened beverages and increasing water and as noted, discontinuing carb, especially carb snacks.      She reported that she and her  are having more success in following keto diet at home and  is also losing.      She reported that she was not offered the new position she applied for at her job.  Discussed a long history of challenges at her company and stress associated with some difficult coworker interactions.  Patient encouraged to bring these challenges into therapy for assistance in improving communication and support in navigating job  search.  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      Patient reported that she discontinued vivance and her blood pressure has been better.  She is also off prozac and reported that her mood continues to be good and her energy up.     She continues to plan for bariatric surgery and asked me to send a final approval letter to [facility]. Of note, the patient had weight gain following her last evaluation and it was suggested that she maintain or lose weight, continue her program of alcohol recovery and detail the efforts she is taking to change her use of food to cope with stress.  A note was sent to [facility] psychologist, Dr. Fela Ibarra, who responded with a reminder that the patient is encouraged to continue AA attendance.  Patient called to discuss her attendance and she reported that she does not wish to continue AA and believes that she is coping well with recovery and does not need the additional support of AA.  She has also graduated from her chemical health program at [facility].  She was quite disappointed that [facility] may require that she continue treatment.  Educated patient about the concern for relapse following bariatric surgery and her choice as to how she receives ongoing support to maintain her goal of alcohol abstinence following surgery.      Objective:  Patient was on time for today s session, appropriately groomed and dressed, and demonstrated good eye contact.  She appeared friendly, alert and oriented.  Mood was euthymic with appropriate range of affect. Patient denied suicidal or assaultive ideation, plan, or intent.        Assessment:  The patient has a longstanding history of obesity and continues to report current main motivation for weight loss surgery as physical health concerns and her desire to be more physically active and able to interact with her grandchild.  She is preparing for weight loss surgery by addressing mood and marital challenges.   Sustained alcohol abstinence.      Plan:  Patient will be seen in 2 weeks.  Encouraged self monitoring and daily weigh in.    Time In:  4:00  Time Out:  5:00    Diagnosis:  Axis I Mdd, mild; psych factors impacting health, substance dependence in full sustained remission   Axis II Deferred   Axis III Obesity (278.00), please see medical records for details   [place] IV Psychosocial and Environmental Stressors: Health       Iza Padilla, Ph.D., L.P.      *In accordance with the Rules of the [place] Board of [employer], it is noted that psychological descriptions and scientific procedures underlying psychological evaluations have limitations.  Absolute predictions cannot be made based on information in this report.

## 2017-12-13 ENCOUNTER — OFFICE VISIT (OUTPATIENT)
Dept: PSYCHOLOGY | Facility: CLINIC | Age: 57
End: 2017-12-13
Payer: COMMERCIAL

## 2017-12-13 DIAGNOSIS — F10.21 ALCOHOL DEPENDENCE IN SUSTAINED FULL REMISSION (H): ICD-10-CM

## 2017-12-13 DIAGNOSIS — F54 PSYCHOLOGICAL FACTORS AFFECTING MORBID OBESITY (H): ICD-10-CM

## 2017-12-13 DIAGNOSIS — F33.0 MAJOR DEPRESSIVE DISORDER, RECURRENT EPISODE, MILD (H): Primary | ICD-10-CM

## 2017-12-13 DIAGNOSIS — E66.01 PSYCHOLOGICAL FACTORS AFFECTING MORBID OBESITY (H): ICD-10-CM

## 2017-12-13 NOTE — MR AVS SNAPSHOT
After Visit Summary   12/13/2017    Cheyanne Guardado    MRN: [id number]           Patient Information     Date Of Birth          1960        Visit Information        Provider Department      12/13/2017 4:00 PM Iza Padilla, PhD [facility]        Today's Diagnoses     Major depressive disorder, recurrent episode, mild (H)    -  1    Alcohol dependence in sustained full remission (H)        Psychological factors affecting morbid obesity (H)           Follow-ups after your visit        Who to contact     Please call your clinic at [phone] to:    Ask questions about your health    Make or cancel appointments    Discuss your medicines    Learn about your test results    Speak to your doctor   If you have compliments or concerns about an experience at your clinic, or if you wish to file a complaint, please contact [facility] Patient Relations at [phone] or email us at [email or web address]         Additional Information About Your Visit        MyChart Information     [employer] gives you secure access to your electronic health record. If you see a primary care provider, you can also send messages to your care team and make appointments. If you have questions, please call your primary care clinic.  If you do not have a primary care provider, please call [phone] and they will assist you.      Web[employer] is an electronic gateway that provides easy, online access to your medical records. With Web[employer], you can request a clinic appointment, read your test results, renew a prescription or communicate with your care team.     To access your existing account, please contact your [facility] or call [phone] for assistance.        Care EveryWhere ID     This is your Care EveryWhere ID. This could be used by other organizations to access your [place] medical records  [id number]         Blood Pressure from Last  3 Encounters:   11/25/17 (!) 136/97   06/19/17 (!) 125/93   06/05/17 (!) 128/98    Weight from Last 3 Encounters:   11/25/17 91.8 kg (202 lb 6.4 oz)   06/19/17 92.1 kg (203 lb)   06/05/17 90.7 kg (200 lb)              Today, you had the following     No orders found for display       Primary Care Provider Office Phone # Fax #    Antwon Almazan [phone] [address] [facility] [address]        Equal Access to Services     [facility]: Hadii ti lozano hadasho Sozain, waaxda luqadaha, qaybta kaalmada adeyoanayada, edward rizo . So [facility] [phone].    ATENCIÓN: Si habla español, tiene a hinojosa disposición servicios gratuitos de asistencia lingüística. Llame al [phone].    We comply with applicable federal civil rights laws and [place] laws. We do not discriminate on the basis of race, color, national origin, age, disability, sex, sexual orientation, or gender identity.            Thank you!     Thank you for choosing [facility]  for your care. Our goal is always to provide you with excellent care. Hearing back from our patients is one way we can continue to improve our services. Please take a few minutes to complete the written survey that you may receive in the mail after your visit with us. Thank you!             Your Updated Medication List - Protect others around you: Learn how to safely use, store and throw away your medicines at www.disposemymeds.org.          This list is accurate as of: 12/13/17 11:59 PM.  Always use your most recent med list.                   Brand Name Dispense Instructions for use Diagnosis    azithromycin 250 MG tablet    ZITHROMAX    6 tablet    2 tablets the first day, then 1 tablet daily for the next 4 days    Acute bronchitis with coexisting condition requiring prophylactic treatment       clonazePAM 0.5 MG tablet    klonoPIN          hydrochlorothiazide 25 MG tablet    HYDRODIURIL    90 tablet     Take 1 tablet (25 mg) by mouth daily    Essential hypertension       losartan 100 MG tablet    COZAAR    90 tablet    Take 1 tablet (100 mg) by mouth daily    Essential hypertension       MODAFINIL PO      Take 100 mg by mouth daily        omeprazole 20 MG CR capsule    priLOSEC    14 capsule    Take 1 capsule (20 mg) by mouth daily    Gastroesophageal reflux disease, esophagitis presence not specified       ZYRTEC ALLERGY PO      Take  by mouth.

## 2017-12-20 ENCOUNTER — TELEPHONE (OUTPATIENT)
Dept: INTERNAL MEDICINE | Facility: CLINIC | Age: 57
End: 2017-12-20
Payer: COMMERCIAL

## 2017-12-20 NOTE — TELEPHONE ENCOUNTER
Pt has had colonoscopy 09/15/17  Repeat in 5 years per meir reed md       Found 2 polyps 3-5mm in size         Other egan normal       Jaron.,CMA   6:00 PM  12/20/2017

## 2017-12-27 ENCOUNTER — TELEPHONE (OUTPATIENT)
Dept: INTERNAL MEDICINE | Facility: CLINIC | Age: 57
End: 2017-12-27

## 2017-12-27 NOTE — LETTER
[facility]  [address] [facility] [address]  Phone: [phone]  Fax: [phone]    12/27/17    Cheyanne Guardado  [address] [place] [address]      To whom it may concern:     Our records indicate that you have not scheduled for a(n)appointment with  Karley which was recommended by your health care team. Monitoring and managing your preventative and chronic health conditions are very important to us.     If you have received your health care elsewhere, please provide us with that information so it can be documented in your chart.    Please call [phone] or message us through your [facility] account to schedule an appointment or provide information for your chart.     I look forward to seeing you and working with you on your health care needs.       *If you have already scheduled an appointment, please disregard this reminder    Sincerely,      Ayanna Crandall MD

## 2017-12-27 NOTE — TELEPHONE ENCOUNTER
Panel Management Review      Patient has the following on her problem list:     Depression / Dysthymia review    Measure:  Needs PHQ-9 score of 4 or less during index window.  Administer PHQ-9 and if score is 5 or more, send encounter to provider for next steps.    5 - 7 month window range:       PHQ-9 SCORE 12/6/2012 8/14/2014 2/12/2015   Total Score 0 0 11       If PHQ-9 recheck is 5 or more, route to provider for next steps.    Patient is due for:  PHQ9 and DAP        Composite cancer screening  Chart review shows that this patient is due/due soon for the following None  Summary:    Patient is due/failing the following:   DAP and PAP    Action needed:   Patient needs office visit for htn,pap,phq9.    Type of outreach:    Sent letter.    Questions for provider review:    None                                                                                                                                    DEBRA Lemus   8:08 AM  12/27/2017         Chart routed to self   .

## 2018-01-04 NOTE — PROGRESS NOTES
Health Psychology                                      Department of Medicine                                           [facility] Mail Code 741    Michelle Booker, Ph.D., L.P. [phone]  [address] [place] [address], [facility] Augusto Romero, Ph.D.,  L.P. [phone]  [place] [address]  Deon Pinzon, Ph.D., A.B.P.P., L.P. [phone]       Iza Padilla, PhD, LP [phone]  ________________________________________________________________________________________________  Health Psychology - Follow up Visit  Confidential Summary*    [facility]    CHIEF COMPLAINT/REASON FOR VISIT  Cognitive behavioral therapy and behavioral counseling in context of health and behavior issues related to weight loss and upcoming weight loss surgery/gastric sleeve surgery.       Patient was seen today for a 60 minute individual health and behavior intervention session.      Subjective:  WEIGHT TODAY .  She is down 6 pounds from last session. She reported that she is having good success with a keto diet and is noticing that her appetite seems to be decreased and her ability to resist cravings is higher.  She reported that she has discontinued all psych medications and continues to do well emotionally.  She reported that she was disappointed after receiving my phone call informing her that her [place] psychologist suggested that she should continue attending AA meetings.  The patient shared an email that she wrote to Dr. Ibarra defending her decision to discontinue AA meetings.  She also shared an email back from Dr. Ibarra indicating that she can still qualify for bariatric surgery.   The patient asked me to complete a letter summarizing her treatment and send it directly to Dr. Ibarra.  Suggested that I write a draft and review with her.  Given the patients history of psychiatric symptoms including an inpatient psychiatric admission  "followed by ECT treatment, a history of trauma and alcohol dependence, a review of details to provide to Aaron may be appropriate.      She reported improved marital communication.  She is enjoying time spent with her grandson and observed that his son is more of a \"helicopter parent\" than she was.    Objective:  Patient was on time for today s session, appropriately groomed and dressed, and demonstrated good eye contact.  She appeared friendly, alert and oriented.  Mood was euthymic with appropriate range of affect. Patient denied suicidal or assaultive ideation, plan, or intent.        Assessment:  The patient has a longstanding history of obesity and continues to report current main motivation for weight loss surgery as physical health concerns and her desire to be more physically active and able to interact with her grandchild.  She is preparing for weight loss surgery by addressing mood and marital challenges.  Sustained alcohol abstinence.      Plan:  Patient will be seen in 2 weeks.  Encouraged self monitoring and daily weigh in.    Time In:  4:00  Time Out:  5:00    Diagnosis:  Axis I Mdd, mild; psych factors impacting health, substance dependence in full sustained remission   Axis II Deferred   Axis III Obesity (278.00), please see medical records for details   [place] IV Psychosocial and Environmental Stressors: Health       Iza Padilla, Ph.D., L.P.      *In accordance with the Rules of the [place] Board of Psychology, it is noted that psychological descriptions and scientific procedures underlying psychological evaluations have limitations.  Absolute predictions cannot be made based on information in this report.       "

## 2018-01-09 ENCOUNTER — OFFICE VISIT (OUTPATIENT)
Dept: FAMILY MEDICINE | Facility: CLINIC | Age: 58
End: 2018-01-09
Payer: COMMERCIAL

## 2018-01-09 VITALS
SYSTOLIC BLOOD PRESSURE: 134 MMHG | HEART RATE: 102 BPM | DIASTOLIC BLOOD PRESSURE: 83 MMHG | RESPIRATION RATE: 16 BRPM | TEMPERATURE: 97.7 F | WEIGHT: 195 LBS | BODY MASS INDEX: 39.39 KG/M2 | OXYGEN SATURATION: 97 %

## 2018-01-09 DIAGNOSIS — K21.9 GASTROESOPHAGEAL REFLUX DISEASE WITHOUT ESOPHAGITIS: ICD-10-CM

## 2018-01-09 DIAGNOSIS — H10.31 ACUTE CONJUNCTIVITIS OF RIGHT EYE, UNSPECIFIED ACUTE CONJUNCTIVITIS TYPE: Primary | ICD-10-CM

## 2018-01-09 PROCEDURE — 99214 OFFICE O/P EST MOD 30 MIN: CPT | Performed by: INTERNAL MEDICINE

## 2018-01-09 RX ORDER — OMEPRAZOLE 40 MG/1
40 CAPSULE, DELAYED RELEASE ORAL DAILY
Qty: 30 CAPSULE | Refills: 1 | Status: SHIPPED | OUTPATIENT
Start: 2018-01-09 | End: 2022-11-30

## 2018-01-09 RX ORDER — POLYMYXIN B SULFATE AND TRIMETHOPRIM 1; 10000 MG/ML; [USP'U]/ML
2 SOLUTION OPHTHALMIC 4 TIMES DAILY
Qty: 3 ML | Refills: 0 | Status: SHIPPED | OUTPATIENT
Start: 2018-01-09 | End: 2018-01-16

## 2018-01-09 NOTE — MR AVS SNAPSHOT
After Visit Summary   1/9/2018    Cheyanne Guardado    MRN: [id number]           Patient Information     Date Of Birth          1960        Visit Information        Provider Department      1/9/2018 10:20 AM Ronda Mckenzie MD [facility]        Today's Diagnoses     Acute conjunctivitis of right eye, unspecified acute conjunctivitis type    -  1    Gastroesophageal reflux disease without esophagitis           Follow-ups after your visit        Follow-up notes from your care team     Return in about 1 month (around 2/9/2018) for follow up with primary doctor.  sooner if not improving within 2 weeks.      Who to contact     If you have questions or need follow up information about today's clinic visit or your schedule please contact [facility] directly at [phone].  Normal or non-critical lab and imaging results will be communicated to you by MyChart, letter or phone within 4 business days after the clinic has received the results. If you do not hear from us within 7 days, please contact the clinic through MyChart or phone. If you have a critical or abnormal lab result, we will notify you by phone as soon as possible.  Submit refill requests through [employer] or call your pharmacy and they will forward the refill request to us. Please allow 3 business days for your refill to be completed.          Additional Information About Your Visit        MyChart Information     [employer] gives you secure access to your electronic health record. If you see a primary care provider, you can also send messages to your care team and make appointments. If you have questions, please call your primary care clinic.  If you do not have a primary care provider, please call [phone] and they will assist you.        Care EveryWhere ID     This is your Care EveryWhere ID. This could be used by other organizations to access your [place] medical records  [id number]        Your Vitals Were      Pulse Temperature Respirations Pulse Oximetry Breastfeeding? BMI (Body Mass Index)    102 97.7  F (36.5  C) (Oral) 16 97% No 39.39 kg/m2       Blood Pressure from Last 3 Encounters:   01/09/18 134/83   11/25/17 (!) 136/97   06/19/17 (!) 125/93    Weight from Last 3 Encounters:   01/09/18 195 lb (88.5 kg)   11/25/17 202 lb 6.4 oz (91.8 kg)   06/19/17 203 lb (92.1 kg)              Today, you had the following     No orders found for display         Today's Medication Changes          These changes are accurate as of: 1/9/18 12:18 PM.  If you have any questions, ask your nurse or doctor.               Start taking these medicines.        Dose/Directions    omeprazole 40 MG capsule   Commonly known as:  priLOSEC   Used for:  Gastroesophageal reflux disease without esophagitis   Started by:  Ronda Mckenzie MD        Dose:  40 mg   Take 1 capsule (40 mg) by mouth daily Take 30-60 minutes before a meal.   Quantity:  30 capsule   Refills:  1       trimethoprim-polymyxin b ophthalmic solution   Commonly known as:  POLYTRIM   Used for:  Acute conjunctivitis of right eye, unspecified acute conjunctivitis type   Started by:  Ronda Mckenzie MD        Dose:  2 drop   Place 2 drops into both eyes 4 times daily for 7 days   Quantity:  3 mL   Refills:  0            Where to get your medicines      These medications were sent to [facility] [address] [place] [address], [address]  [address] [facility], [address], [facility] [address]     Phone:  [phone]     omeprazole 40 MG capsule    trimethoprim-polymyxin b ophthalmic solution                Primary Care Provider Office Phone # Fax #    Antwon Almazan [phone] [address] [facility] [address]        Equal Access to Services     ROLANDO CARSON AH: Benny Uribe, walachelleda luqadaha, qaybta kaalmada perry, edward jean. So [facility] [phone].    ATENCIÓN: nilton Merritt  a hinojosa disposición servicios gratuitos de asistencia lingüística. Francy pendleton [phone].    We comply with applicable federal civil rights laws and [place] laws. We do not discriminate on the basis of race, color, national origin, age, disability, sex, sexual orientation, or gender identity.            Thank you!     Thank you for [facility]. Our goal is always to provide you with excellent care. Hearing back from our patients is one way we can continue to improve our services. Please take a few minutes to complete the written survey that you may receive in the mail after your visit with us. Thank you!             Your Updated Medication List - Protect others around you: Learn how to safely use, store and throw away your medicines at www.disposemymeds.org.          This list is accurate as of: 1/9/18 12:18 PM.  Always use your most recent med list.                   Brand Name Dispense Instructions for use Diagnosis    clonazePAM 0.5 MG tablet    klonoPIN          hydrochlorothiazide 25 MG tablet    HYDRODIURIL    90 tablet    Take 1 tablet (25 mg) by mouth daily    Essential hypertension       losartan 100 MG tablet    COZAAR    90 tablet    Take 1 tablet (100 mg) by mouth daily    Essential hypertension       MODAFINIL PO      Take 100 mg by mouth daily        omeprazole 40 MG capsule    priLOSEC    30 capsule    Take 1 capsule (40 mg) by mouth daily Take 30-60 minutes before a meal.    Gastroesophageal reflux disease without esophagitis       trimethoprim-polymyxin b ophthalmic solution    POLYTRIM    3 mL    Place 2 drops into both eyes 4 times daily for 7 days    Acute conjunctivitis of right eye, unspecified acute conjunctivitis type       ZYRTEC ALLERGY PO      Take  by mouth.

## 2018-01-09 NOTE — NURSING NOTE
"Chief Complaint   Patient presents with     Conjunctivitis     c/o pink, discharges and swelling       Initial /83  Pulse 102  Temp 97.7  F (36.5  C) (Oral)  Resp 16  Wt 195 lb (88.5 kg)  SpO2 97%  Breastfeeding? No  BMI 39.39 kg/m2 Estimated body mass index is 39.39 kg/(m^2) as calculated from the following:    Height as of 9/15/16: 4' 11\" (1.499 m).    Weight as of this encounter: 195 lb (88.5 kg).  Medication Reconciliation: complete   Tasha Yeh MA      "

## 2018-01-09 NOTE — PROGRESS NOTES
SUBJECTIVE:  Cheyanne Guardado is an 57 year old female who presents for eye problem.  For a while has had some eye irritation.  Wears contacts.  Saw her optometrist and got prednisone and eye drops and didn't wear contacts for a couple weeks and sxs improved. Both eyes affected but right eye worse.  Feels having some gunk and yellow drainage from the right eye. Eyes are itchy.  Has been around people with pink eye at work.  No fevers, chills, sweats.  Is wearing contacts again.  Has had mild cold sxs recently.  Taking zantac for gerd sxs.  Has been on omeprazole in past, but not for a while.  sxs have recurred and is taking zantac 3-4 times a day and that helps but then it comes back.  When eats she gets reflux sxs.  Has some discomfort in abdomen and occasional burning into chest and acidic taste.  No blood in stools or black stools.  No diarrhea.  No recent travel.     has a past medical history of Allergies; Anxiety; Carcinoid tumor; Cervical dysplasia (1985); Constipation; Depression; Erythema nodosum; HTN (hypertension); Irritable bowel disease; Ovarian cyst; Sleep apnea (2017); Unspecified asthma(493.90); Urticaria; and Venereal warts.  Social History     Social History     Marital status:      Spouse name: N/A     Number of children: N/A     Years of education: N/A     Occupational History      Unemployed     Social History Main Topics     Smoking status: Former Smoker     Packs/day: 1.50     Years: 20.00     Types: Cigarettes     Quit date: 4/2/1998     Smokeless tobacco: Never Used     Alcohol use No     Drug use: No     Sexual activity: Yes     Partners: Male     Birth control/ protection: Surgical      Comment: Tubal     Other Topics Concern      Service No     Blood Transfusions No     Caffeine Concern No     Occupational Exposure No     Hobby Hazards No     Sleep Concern No     Stress Concern No     Weight Concern Yes     Special Diet Yes     Back Care No     Exercise Yes     Bike Helmet  No     Seat Belt Yes     Self-Exams Yes     Social History Narrative     Family History   Problem Relation Age of Onset     Hypertension Mother      HEART DISEASE Mother      endometrial cancer      CANCER Maternal Grandmother      renal/skin     Asthma Brother      Eczema Brother      Asthma Son      Eczema Father      Alzheimer Disease Father      Thyroid Disease Sister      Thyroid Disease Sister        ALLERGIES:  Codeine; Definity; and Iodine    Current Outpatient Prescriptions   Medication     hydrochlorothiazide (HYDRODIURIL) 25 MG tablet     MODAFINIL PO     Cetirizine HCl (ZYRTEC ALLERGY PO)     omeprazole (PRILOSEC) 20 MG CR capsule     losartan (COZAAR) 100 MG tablet     clonazePAM (KLONOPIN) 0.5 MG tablet     No current facility-administered medications for this visit.          ROS:  ROS is done and is negative for general, constitutional, eye, ENT, Respiratory, cardiovascular, GI, , Skin, musculoskeletal except as noted elsewhere.      OBJECTIVE:  /83  Pulse 102  Temp 97.7  F (36.5  C) (Oral)  Resp 16  Wt 195 lb (88.5 kg)  SpO2 97%  Breastfeeding? No  BMI 39.39 kg/m2  GENERAL APPEARANCE: Alert, in no acute distress  EYES: PERRLA, EOMI without pain.  Right conjunctiva erythematous with small amount of yellow drainage at medial corner of eye.  Left eye with minimal erythema of conjunctiva.  EARS: External ears normal. Canals clear. TM's normal.  NOSE:normal  OROPHARYNX:normal  NECK:No adenopathy,masses or thyromegaly  RESP: normal and clear to auscultation  CV:regular rate and rhythm  ABDOMEN: Abdomen soft, non-tender. BS normal. No masses, organomegaly  SKIN: no ulcers, lesions or rash  MUSCULOSKELETAL:Musculoskeletal normal      RESULTS  Results for orders placed or performed in visit on 11/25/17   Rapid strep screen   Result Value Ref Range    Specimen Description Throat     Rapid Strep A Screen       NEGATIVE: No Group A streptococcal antigen detected by immunoassay, await culture  report.   Beta strep group A culture   Result Value Ref Range    Specimen Description Throat     Culture Micro No beta hemolytic Streptococcus Group A isolated    .  No results found for this or any previous visit (from the past 48 hour(s)).    ASSESSMENT/PLAN:    ASSESSMENT / PLAN:  (H10.31) Acute conjunctivitis of right eye, unspecified acute conjunctivitis type  (primary encounter diagnosis)  Comment: has had some sxs fairly recently and saw her eye doctor, but no has sxs again and this time has some yellowish drainage.  Will tx with abx drops for conjunctivits.    Plan: trimethoprim-polymyxin b (POLYTRIM) ophthalmic         solution        Reviewed medication instructions and side effects. Follow up if experiences side effects.. I reviewed supportive care, expected course, and signs of concern.  Follow up with her eye doctor  if she does not improve within 7 day(s) or if worsens in any way.  Reviewed red flag symptoms and is to go to the ER if experiences any of these.    (K21.9) Gastroesophageal reflux disease without esophagitis  Comment: has h/o and sxs have recurred.  Zantac helps sxs but is needing to take a lot of it  Plan: omeprazole (PRILOSEC) 40 MG capsule        Start omeprazole 40 mg daily.  Plan for 30 days and then f/u with pcp even if has improved, f/u sooner if not improving within 2 weeks. Reviewed medication instructions and side effects. Follow up if experiences side effects..  May use otc tums or maalox for breakthrough sxs.  I reviewed supportive care, expected course, and signs of concern.  Follow up I 1 month, or sooner as needed or if she does not improve within 2 week(s) or if worsens in any way.  Reviewed red flag symptoms and is to go to the ER if experiences any of these.      See [facility] for orders, medications, letters, patient instructions    Ronda Mckenzie M.D.

## 2018-01-12 ENCOUNTER — MYC MEDICAL ADVICE (OUTPATIENT)
Dept: INTERNAL MEDICINE | Facility: CLINIC | Age: 58
End: 2018-01-12

## 2018-01-14 NOTE — TELEPHONE ENCOUNTER
Patient appears to be due on some Health Care Maintenance (a BMP lab test after an increase losartan in 6/2017; a PAP)  Please advise the patient an annual physical visit with me in a few weeks to bring her up to date on her PAP, and to recheck her GERD symptoms, to order the EGD.    Thank you,  Ayanna Crandall MD

## 2018-01-15 ENCOUNTER — TELEPHONE (OUTPATIENT)
Dept: INTERNAL MEDICINE | Facility: CLINIC | Age: 58
End: 2018-01-15

## 2018-01-15 ENCOUNTER — OFFICE VISIT (OUTPATIENT)
Dept: INTERNAL MEDICINE | Facility: CLINIC | Age: 58
End: 2018-01-15
Payer: COMMERCIAL

## 2018-01-15 VITALS
OXYGEN SATURATION: 96 % | DIASTOLIC BLOOD PRESSURE: 89 MMHG | HEART RATE: 111 BPM | BODY MASS INDEX: 39.31 KG/M2 | HEIGHT: 59 IN | RESPIRATION RATE: 18 BRPM | WEIGHT: 195 LBS | SYSTOLIC BLOOD PRESSURE: 135 MMHG | TEMPERATURE: 99.1 F

## 2018-01-15 DIAGNOSIS — K21.9 GASTROESOPHAGEAL REFLUX DISEASE, ESOPHAGITIS PRESENCE NOT SPECIFIED: Primary | ICD-10-CM

## 2018-01-15 DIAGNOSIS — F10.21 ALCOHOL DEPENDENCE IN SUSTAINED FULL REMISSION (H): ICD-10-CM

## 2018-01-15 DIAGNOSIS — F33.0 MAJOR DEPRESSIVE DISORDER, RECURRENT EPISODE, MILD (H): ICD-10-CM

## 2018-01-15 PROCEDURE — 99214 OFFICE O/P EST MOD 30 MIN: CPT | Performed by: INTERNAL MEDICINE

## 2018-01-15 NOTE — MR AVS SNAPSHOT
After Visit Summary   1/15/2018    Cheyanne Guardado    MRN: [id number]           Patient Information     Date Of Birth          1960        Visit Information        Provider Department      1/15/2018 6:30 PM Ayanna Crandall MD [facility]        Today's Diagnoses     Gastroesophageal reflux disease, esophagitis presence not specified    -  1      Care Instructions    Please call [phone] to schedule an upper endoscopy soon.     We will get the records of your PAP, etc from [facility] to update our records.                     Follow-ups after your visit        Additional Services     GASTROENTEROLOGY ADULT REF PROCEDURE ONLY       Last Lab Result: Creatinine (mg/dL)       Date                     Value                 08/03/2016               0.86             ----------  Body mass index is 39.39 kg/(m^2).     Needed:  No  Language:  English    Patient will be contacted to schedule procedure.     Please be aware that coverage of these services is subject to the terms and limitations of your health insurance plan.  Call member services at your health plan with any benefit or coverage questions.  Any procedures must be performed at a [place] facility OR coordinated by your clinic's referral office.    Please bring the following with you to your appointment:    (1) Any X-Rays, CTs or MRIs which have been performed.  Contact the facility where they were done to arrange for  prior to your scheduled appointment.    (2) List of current medications   (3) This referral request   (4) Any documents/labs given to you for this referral                  Who to contact     If you have questions or need follow up information about today's clinic visit or your schedule please contact [facility] directly at [phone].  Normal or non-critical lab and imaging results will be communicated to you by MyChart, letter or phone within 4 business days after the  "clinic has received the results. If you do not hear from us within 7 days, please contact the clinic through [employer] or phone. If you have a critical or abnormal lab result, we will notify you by phone as soon as possible.  Submit refill requests through [employer] or call your pharmacy and they will forward the refill request to us. Please allow 3 business days for your refill to be completed.          Additional Information About Your Visit        [email or web address] Information     [employer] gives you secure access to your electronic health record. If you see a primary care provider, you can also send messages to your care team and make appointments. If you have questions, please call your primary care clinic.  If you do not have a primary care provider, please call [phone] and they will assist you.        Care EveryWhere ID     This is your Care EveryWhere ID. This could be used by other organizations to access your [place] medical records  [id number]        Your Vitals Were     Pulse Temperature Respirations Height Pulse Oximetry BMI (Body Mass Index)    111 99.1  F (37.3  C) (Oral) 18 4' 11\" (1.499 m) 96% 39.39 kg/m2       Blood Pressure from Last 3 Encounters:   01/15/18 (!) 125/92   01/09/18 134/83   11/25/17 (!) 136/97    Weight from Last 3 Encounters:   01/15/18 195 lb (88.5 kg)   01/09/18 195 lb (88.5 kg)   11/25/17 202 lb 6.4 oz (91.8 kg)              We Performed the Following     GASTROENTEROLOGY ADULT REF PROCEDURE ONLY        Primary Care Provider Office Phone # Fax #    Antwon Almazan [phone] [address] [address] [address]        Equal Access to Services     [facility] : Hadii ti Uribe, waaxda luqadaha, qaybta kaalmaedward lin . So [facility] [phone].    ATENCIÓN: Si habla español, tiene a hinojosa disposición servicios gratuitos de asistencia lingüística. Llame al [phone].    We comply with applicable " federal civil rights laws and [place] laws. We do not discriminate on the basis of race, color, national origin, age, disability, sex, sexual orientation, or gender identity.            Thank you!     Thank you for choosing [facility]  [facility]. Our goal is always to provide you with excellent care. Hearing back from our patients is one way we can continue to improve our services. Please take a few minutes to complete the written survey that you may receive in the mail after your visit with us. Thank you!             Your Updated Medication List - Protect others around you: Learn how to safely use, store and throw away your medicines at www.disposemymeds.org.          This list is accurate as of: 1/15/18  7:13 PM.  Always use your most recent med list.                   Brand Name Dispense Instructions for use Diagnosis    clonazePAM 0.5 MG tablet    klonoPIN          hydrochlorothiazide 25 MG tablet    HYDRODIURIL    90 tablet    Take 1 tablet (25 mg) by mouth daily    Essential hypertension       losartan 100 MG tablet    COZAAR    90 tablet    Take 1 tablet (100 mg) by mouth daily    Essential hypertension       MODAFINIL PO      Take 100 mg by mouth daily        omeprazole 40 MG capsule    priLOSEC    30 capsule    Take 1 capsule (40 mg) by mouth daily Take 30-60 minutes before a meal.    Gastroesophageal reflux disease without esophagitis       trimethoprim-polymyxin b ophthalmic solution    POLYTRIM    3 mL    Place 2 drops into both eyes 4 times daily for 7 days    Acute conjunctivitis of right eye, unspecified acute conjunctivitis type       ZYRTEC ALLERGY PO      Take  by mouth.

## 2018-01-15 NOTE — PATIENT INSTRUCTIONS
Please call [phone] to schedule an upper endoscopy soon.     We will get the records of your PAP, etc from [facility] to update our records.

## 2018-01-15 NOTE — TELEPHONE ENCOUNTER
Called patient, informed pt of providers note as written below, pt verbalized good understanding.    We made her appointment for today.   JOSÉ MIGUEL Naik RN

## 2018-01-15 NOTE — LETTER
My Depression Action Plan  Name: Cheyanne Guardado   Date of Birth 1960  Date: 1/15/2018    My doctor: Antwon Almazan   My clinic: [address] [address] [phone]          GREEN    ZONE   Good Control    What it looks like:     Things are going generally well. You have normal up s and down s. You may even feel depressed from time to time, but bad moods usually last less than a day.   What you need to do:  1. Continue to care for yourself (see self care plan)  2. Check your depression survival kit and update it as needed  3. Follow your physician s recommendations including any medication.  4. Do not stop taking medication unless you consult with your physician first.           YELLOW         ZONE Getting Worse    What it looks like:     Depression is starting to interfere with your life.     It may be hard to get out of bed; you may be starting to isolate yourself from others.    Symptoms of depression are starting to last most all day and this has happened for several days.     You may have suicidal thoughts but they are not constant.   What you need to do:     1. Call your care team, your response to treatment will improve if you keep your care team informed of your progress. Yellow periods are signs an adjustment may need to be made.     2. Continue your self-care, even if you have to fake it!    3. Talk to someone in your support network    4. Open up your depression survival kit           RED    ZONE Medical Alert - Get Help    What it looks like:     Depression is seriously interfering with your life.     You may experience these or other symptoms: You can t get out of bed most days, can t work or engage in other necessary activities, you have trouble taking care of basic hygiene, or basic responsibilities, thoughts of suicide or death that will not go away, self-injurious behavior.     What you need to do:  1. Call your care team and  request a same-day appointment. If they are not available (weekends or after hours) call your local crisis line, emergency room or 911.      Electronically signed by: Jaron Agarwal, January 15, 2018    Depression Self Care Plan / Survival Kit    Self-Care for Depression  Here s the deal. Your body and mind are really not as separate as most people think.  What you do and think affects how you feel and how you feel influences what you do and think. This means if you do things that people who feel good do, it will help you feel better.  Sometimes this is all it takes.  There is also a place for medication and therapy depending on how severe your depression is, so be sure to consult with your medical provider and/ or Behavioral Health Consultant if your symptoms are worsening or not improving.     In order to better manage my stress, I will:    Exercise  Get some form of exercise, every day. This will help reduce pain and release endorphins, the  feel good  chemicals in your brain. This is almost as good as taking antidepressants!  This is not the same as joining a gym and then never going! (they count on that by the way ) It can be as simple as just going for a walk or doing some gardening, anything that will get you moving.      Hygiene   Maintain good hygiene (Get out of bed in the morning, Make your bed, Brush your teeth, Take a shower, and Get dressed like you were going to work, even if you are unemployed).  If your clothes don't fit try to get ones that do.    Diet  I will strive to eat foods that are good for me, drink plenty of water, and avoid excessive sugar, caffeine, alcohol, and other mood-altering substances.  Some foods that are helpful in depression are: complex carbohydrates, B vitamins, flaxseed, fish or fish oil, fresh fruits and vegetables.    Psychotherapy  I agree to participate in Individual Therapy (if recommended).    Medication  If prescribed medications, I agree to take them.  Missing doses  can result in serious side effects.  I understand that drinking alcohol, or other illicit drug use, may cause potential side effects.  I will not stop my medication abruptly without first discussing it with my provider.    Staying Connected With Others  I will stay in touch with my friends, family members, and my primary care provider/team.    Use your imagination  Be creative.  We all have a creative side; it doesn t matter if it s oil painting, sand castles, or mud pies! This will also kick up the endorphins.    Witness Beauty  (AKA stop and smell the roses) Take a look outside, even in mid-winter. Notice colors, textures. Watch the squirrels and birds.     Service to others  Be of service to others.  There is always someone else in need.  By helping others we can  get out of ourselves  and remember the really important things.  This also provides opportunities for practicing all the other parts of the program.    Humor  Laugh and be silly!  Adjust your TV habits for less news and crime-drama and more comedy.    Control your stress  Try breathing deep, massage therapy, biofeedback, and meditation. Find time to relax each day.     My support system    Clinic Contact:  Phone number:    Contact 1:  Phone number:    Contact 2:  Phone number:    Mandaeism/:  Phone number:    Therapist:  Phone number:    Local crisis center:    Phone number:    Other community support:  Phone number:

## 2018-01-16 NOTE — PROGRESS NOTES
"  SUBJECTIVE:   Cheyanne Guardado is a 57 year old female who presents to clinic today for the following health issues:      GERD:   (K21.9) Gastroesophageal reflux disease without esophagitis  Comment: has h/o and sxs have recurred.  Zantac helps sxs but is needing to take a lot of it.    Per the recent  note:  \"Plan: omeprazole (PRILOSEC) 40 MG capsule...Start omeprazole 40 mg daily.  Plan for 30 days and then f/u with pcp even if has improved, f/u sooner if not improving within 2 weeks. Reviewed medication instructions and side effects. Follow up if experiences side effects..  May use otc tums or maalox for breakthrough sxs.  I reviewed supportive care, expected course, and signs of concern.  Follow up I 1 month, or sooner as needed or if she does not improve within 2 week(s) or if worsens in any way.  Reviewed red flag symptoms and is to go to the ER if experiences any of these.\"      Patient is planning bariatric surgery at the [facility]-they require that the patient to schedule an EGD prior to that. No date of bariatric surgery has been set yet.     Patient has had a discomfort in the stomach, but not in the throat, when she swallowing.   No loss in appetite, but the patient does have some early satiety-for the past year or so.  O/w no upper GI alarm symptoms.   No n/v, no odynophagia.     The weight loss has been intentional:  Wt Readings from Last 4 Encounters:   01/15/18 195 lb (88.5 kg)   01/09/18 195 lb (88.5 kg)   11/25/17 202 lb 6.4 oz (91.8 kg)   06/19/17 203 lb (92.1 kg)     Note: the patient had a colonoscopy recently at [facility]-there were no complications during that procedure.   She has a low grade fever today but denies any s/s of infection. She was rushing to get here and was stressed out,as she was running late.       Depression Followup    Status since last visit: Stable     See PHQ-9 for current symptoms.  Other associated symptoms: None    Complicating factors:   Significant life event:  " No   Current substance abuse:  None  Anxiety or Panic symptoms:  No    No flowsheet data found.  The PHQ-9 will be put in the system and the score is 2 today, controlled.         PHQ-9  English  PHQ-9   Any Language  Suicide Assessment Five-step Evaluation and Treatment (SAFE-T)           Reviewed and updated as needed this visit by clinical staffTobacco  Allergies  Meds  Problems  Med Hx  Surg Hx  Fam Hx  Soc Hx        Reviewed and updated as needed this visit by Provider  Meds  Problems         Patient Active Problem List   Diagnosis     HTN (hypertension)     CARDIOVASCULAR SCREENING; LDL GOAL LESS THAN 160     Irritable bowel disease     Carcinoid tumor     Cervical dysplasia     Anxiety     Advanced directives, counseling/discussion     Benign essential hypertension     Essential hypertension     Hypertension goal BP (blood pressure) < 140/90     Major depressive disorder, recurrent episode, mild (H)     Alcohol dependence in sustained full remission (H)     Psychological factors affecting morbid obesity (H)     S/P pneumonectomy       Past Medical History:   Diagnosis Date     Allergies      Anxiety      Carcinoid tumor     RT Lung Removed     Cervical dysplasia     [facility] Biopsy     Constipation      Depression      Erythema nodosum      HTN (hypertension)      Irritable bowel disease      Ovarian cyst      Sleep apnea      Unspecified asthma(493.90)      Urticaria      Venereal warts        Past Surgical History:   Procedure Laterality Date     C  DELIVERY ONLY      x3     HC HYSTEROSCOPY, SURGICAL; W/ ENDOMETRIAL ABLATION, ANY METHOD      10 years ago      SURGICAL HISTORY OF -       right lung removal     SURGICAL HISTORY OF -       ECT     TUBAL LIGATION         Family History   Problem Relation Age of Onset     Hypertension Mother      HEART DISEASE Mother      endometrial cancer      CANCER Maternal Grandmother      renal/skin     Asthma Brother      Eczema Brother      Asthma  "Son      Eczema Father      Alzheimer Disease Father      Thyroid Disease Sister      Thyroid Disease Sister        Social History   Substance Use Topics     Smoking status: Former Smoker     Packs/day: 1.50     Years: 20.00     Types: Cigarettes     Quit date: 4/2/1998     Smokeless tobacco: Never Used     Alcohol use No       Current Outpatient Prescriptions   Medication     trimethoprim-polymyxin b (POLYTRIM) ophthalmic solution     omeprazole (PRILOSEC) 40 MG capsule     hydrochlorothiazide (HYDRODIURIL) 25 MG tablet     losartan (COZAAR) 100 MG tablet     MODAFINIL PO     clonazePAM (KLONOPIN) 0.5 MG tablet     Cetirizine HCl (ZYRTEC ALLERGY PO)     No current facility-administered medications for this visit.          ROS:  Constitutional, HEENT, cardiovascular, pulmonary, GI, , musculoskeletal, neuro, skin, endocrine and psych systems are negative, except as otherwise noted.     OBJECTIVE:                                                    /89  Pulse 111  Temp 99.1  F (37.3  C) (Oral)  Resp 18  Ht 4' 11\" (1.499 m)  Wt 195 lb (88.5 kg)  SpO2 96%  BMI 39.39 kg/m2     GENERAL APPEARANCE: healthy, alert and in no distress    ABDOMEN: soft, nontender, no hepatosplenomegaly, no masses and bowel sounds normal    NEURO: mentation intact and speech normal  PSYCH: mentation appears normal and affect normal/bright.    Results for orders placed or performed in visit on 11/25/17   Rapid strep screen   Result Value Ref Range    Specimen Description Throat     Rapid Strep A Screen       NEGATIVE: No Group A streptococcal antigen detected by immunoassay, await culture report.   Beta strep group A culture   Result Value Ref Range    Specimen Description Throat     Culture Micro No beta hemolytic Streptococcus Group A isolated        No results found for this or any previous visit (from the past 744 hour(s)).      ASSESSMENT/PLAN:                                                        ICD-10-CM    1. " Gastroesophageal reflux disease, esophagitis presence not specified K21.9 GASTROENTEROLOGY ADULT REF PROCEDURE ONLY   2. Alcohol dependence in sustained full remission (H) F10.21    3. Major depressive disorder, recurrent episode, mild (H) F33.0      Time spent coordinating care was approximately 30 minutes out of a total of 39 minutes or less (which was the total duration of the appointment) including the diagnosis, prognosis and treatment of the above medical conditions.     GERD: for >1 year, with some alarm symptoms (early satiety); with partial control on PO medications. PLAN: As per orders above and patient instructions below.  2: controlled. PLAN: continue to monitor as per clinic protocol.  3: PHQ-9 score today is 2, thus controlled. PLAN: Continue current regimen.    Patient Instructions   Please call [phone] to schedule an upper endoscopy soon.     We will get the records of your PAP, etc from [facility] to update our records.                 Ayanna Crandall MD    [facility]  [address] [facility] [address] [phone]

## 2018-01-16 NOTE — TELEPHONE ENCOUNTER
Lipid [id number]  [id number]  Pap [id number]  Colon [id number]    Component Name  8/9/2017     186   101   52   134   3.58   114   FASTING   CHOLESTEROL,TOTAL   TRIGLYCERIDES   HDL CHOLESTEROL   NON-HDL CHOLESTEROL   CHOL/HDL RATIO              LDL CHOLESTEROL     YN THIN PREP PAP SCREEN IMAGED8/7/2017  [facility]  Component Name Value Ref Range   Case Report Gynecologic Cytology Report                       Case: [id number]                                  Authorizing Provider:  Angelia Stringer MBBS        Collected:           08/07/2017 1535              [facility]  Received:            08/08/2017 1126                                     Clinic                                                                       First Screen:          Madelaine Zayas                                                                  Rescreen:              Apollo Dominguez                                                        Specimen:    GYN ThinPrep Vial Screening, Cervical                                                      Vasu Ruff MD - 09/15/2017  5:22 PM CDT  [facility]  _______________________________________________________________________________  Patient Name: Cheyanne Mejiajagjit           Procedure Date: 9/15/2017  MRN: [id number]                       Gender: Female  Account Number: [id number]             YOB: 1960  Admit Type: Ambulatory                  _______________________________________________________________________________    Procedure:                    Colonoscopy  Proceduralist:                Vasu Ruff MD  Indications/Pre-Op Diagnosis: Screening for colorectal malignant neoplasm  Medications:                  Sedation Required Anesthesia Staff Assistance,                                 The level of sedation administered was                                 moderate, Please see nursing flow sheet for                                  moderate sedation documentation.    Procedure Description:       The patient had risks, benefits and alternatives explained to and gave        informed consent. The patient had a stable cardiopulmonary status and        judged an adequate candidate for conscious sedation.       The adult colonoscope was passed through the anus and advanced to the        cecum, identified by appendiceal orifice and ileocecal valve. The        colonoscopy was performed without difficulty. The patient tolerated the        procedure well. The quality of the bowel preparation was good. The        ileocecal valve, appendiceal orifice, and rectum were photographed.    Scope Withdrawal Time 0 hours 24 minutes 17 seconds   Complications:                No immediate complications.  Estimated Blood Loss & Specimen:       Estimated blood loss: none.       Specimen collected: Yes and sent to Laboratory    Findings:       The perianal and digital rectal examinations were normal.       Two sessile polyps were found in the ascending colon. The polyps were 3        to 4 mm in size. These polyps were removed with a cold biopsy forceps.        Resection and retrieval were complete.       The exam was otherwise without abnormality.       A 5 mm polyp was found in the rectum. The polyp was sessile. The polyp        was removed with a cold snare. Resection and retrieval were complete.       A few diverticula were found in the sigmoid colon.    Impressions/Post-Op Diagnosis:       - Two 3 to 4 mm polyps in the ascending colon, removed with a cold        biopsy forceps. Resected and retrieved.       - The examination was otherwise normal.       - One 5 mm polyp in the rectum, removed with a cold snare. Resected and        retrieved.       - Diverticulosis in the sigmoid colon.    Recommendation:       - Repeat colonoscopy in 5 years for surveillance.       - Telephone my office for pathology results in 1 week    XR MAMMO  BILAT SCREENING8/7/2017  [facility]  Result Narrative   Clinical History: Asymptomatic.    Comparison: 2/4/2016, 8/18/2014    Breast Density: Heterogeneously dense, which may obscure small masses.     Findings: Conventional craniocaudal and mediolateral oblique views were   obtained. There is no evidence for spiculated masses, architectural   distortion, asymmetry or suspicious calcifications.        Impression: No evidence of malignancy. Recommend routine annual screening   mammography.    This study was evaluated with the assistance of Computer-Aided Detection.    ACR 1: Negative.    A lay language report of this examination will be mailed to the patient.        [facility]

## 2018-01-19 ENCOUNTER — TRANSFERRED RECORDS (OUTPATIENT)
Dept: HEALTH INFORMATION MANAGEMENT | Facility: CLINIC | Age: 58
End: 2018-01-19

## 2018-08-20 DIAGNOSIS — Z13.9 SCREENING FOR CONDITION: Primary | ICD-10-CM

## 2018-08-20 PROCEDURE — 36415 COLL VENOUS BLD VENIPUNCTURE: CPT

## 2018-08-20 PROCEDURE — 99000 SPECIMEN HANDLING OFFICE-LAB: CPT

## 2020-02-24 ENCOUNTER — HEALTH MAINTENANCE LETTER (OUTPATIENT)
Age: 60
End: 2020-02-24

## 2020-12-13 ENCOUNTER — HEALTH MAINTENANCE LETTER (OUTPATIENT)
Age: 60
End: 2020-12-13

## 2021-04-17 ENCOUNTER — HEALTH MAINTENANCE LETTER (OUTPATIENT)
Age: 61
End: 2021-04-17

## 2021-09-26 ENCOUNTER — HEALTH MAINTENANCE LETTER (OUTPATIENT)
Age: 61
End: 2021-09-26

## 2022-03-13 ENCOUNTER — HEALTH MAINTENANCE LETTER (OUTPATIENT)
Age: 62
End: 2022-03-13

## 2022-05-08 ENCOUNTER — HEALTH MAINTENANCE LETTER (OUTPATIENT)
Age: 62
End: 2022-05-08

## 2022-11-30 ENCOUNTER — ANCILLARY PROCEDURE (OUTPATIENT)
Dept: GENERAL RADIOLOGY | Facility: CLINIC | Age: 62
End: 2022-11-30
Attending: FAMILY MEDICINE
Payer: COMMERCIAL

## 2022-11-30 ENCOUNTER — OFFICE VISIT (OUTPATIENT)
Dept: URGENT CARE | Facility: URGENT CARE | Age: 62
End: 2022-11-30
Payer: COMMERCIAL

## 2022-11-30 VITALS
SYSTOLIC BLOOD PRESSURE: 146 MMHG | BODY MASS INDEX: 35.02 KG/M2 | TEMPERATURE: 97.4 F | HEART RATE: 75 BPM | OXYGEN SATURATION: 99 % | DIASTOLIC BLOOD PRESSURE: 97 MMHG | WEIGHT: 173.4 LBS

## 2022-11-30 DIAGNOSIS — R05.1 ACUTE COUGH: Primary | ICD-10-CM

## 2022-11-30 DIAGNOSIS — R05.1 ACUTE COUGH: ICD-10-CM

## 2022-11-30 DIAGNOSIS — Z90.2 HX OF PNEUMONECTOMY: ICD-10-CM

## 2022-11-30 DIAGNOSIS — Z98.890 HX OF PNEUMONECTOMY: ICD-10-CM

## 2022-11-30 PROCEDURE — 99214 OFFICE O/P EST MOD 30 MIN: CPT | Performed by: FAMILY MEDICINE

## 2022-11-30 PROCEDURE — 71046 X-RAY EXAM CHEST 2 VIEWS: CPT | Mod: TC | Performed by: RADIOLOGY

## 2022-11-30 RX ORDER — FLUOXETINE 40 MG/1
1 CAPSULE ORAL DAILY
COMMUNITY
Start: 2022-09-19

## 2022-11-30 RX ORDER — CHOLECALCIFEROL (VITAMIN D3) 50 MCG
2000 TABLET ORAL DAILY
COMMUNITY

## 2022-11-30 RX ORDER — NICOTINE POLACRILEX 4 MG/1
1 GUM, CHEWING ORAL DAILY
COMMUNITY
Start: 2022-06-08

## 2022-11-30 RX ORDER — AZITHROMYCIN 250 MG/1
TABLET, FILM COATED ORAL
Qty: 6 TABLET | Refills: 0 | Status: SHIPPED | OUTPATIENT
Start: 2022-11-30 | End: 2022-12-05

## 2022-11-30 RX ORDER — ESTRADIOL 0.1 MG/G
CREAM VAGINAL WEEKLY
COMMUNITY
Start: 2022-06-30

## 2022-11-30 NOTE — PATIENT INSTRUCTIONS
Take the azithromycin/z-margaret as prescribed to help prevent infection as you have just one lung      If symptoms worsen please return right away to be evaluated      If the radiologist notes any abnormalities not discussed at the visit today our team will call to discuss. A copy of the radiologist's interpretation of your imaging today will be available on [employer] within 1-2 business days, please call if you have questions.       For cough (can take all of them together):   - Dextromethorphan 'DM' (over the counter - can be found in Robitussin/Delsym/generic cough meds)  - Honey: lozenges/cough drops or mix honey in warm water

## 2022-11-30 NOTE — PROGRESS NOTES
Assessment & Plan     Acute cough  - XR Chest 2 Views  - azithromycin (ZITHROMAX) 250 MG tablet  Dispense: 6 tablet; Refill: 0    Hx of pneumonectomy     Chest XRAY without evidence of pneumonia per my read, given she has a solitary lung I feel it is appropriate to proceed with azithromycin this also meets her expectation as she reports her specialists have recommended antibacterial therapy for her viral respiratory infections.     See AVS summary for additional recommendations reviewed with patient during this visit.         Deon Reynolds MD   [place] [facility]    Néstor Edward is a 62 year old female who presents to clinic today for the following health issues:  Chief Complaint   Patient presents with     Sick     Sx started with body aches around rib cage and lower back the last couple of days, cough at night. Getting worse the last couple of days. Woke this morning with chest congestion. Negative Covid test this morning.     HPI    She has not had any fevers -- body aches she describes being primarily around rib cage. Symptoms started     Hoarse voice starting today.   Absent of fevers.     Has done more than 2 COVID tests which were negative.   Last COVID infection : never had confirmed illness    Hx of pneumonectomy at age 27 for carcinoid tumor ( right side is completely removed)   Historically she reports her lung doctors will place her on antibiotics as she has a solitary lung.       Patient Active Problem List    Diagnosis Date Noted     S/P pneumonectomy 11/25/2017     Priority: Medium     Major depressive disorder, recurrent episode, mild (H) 05/26/2017     Priority: Medium     Alcohol dependence in sustained full remission (H) 05/26/2017     Priority: Medium     Psychological factors affecting morbid obesity (H) 05/26/2017     Priority: Medium     Hypertension goal BP (blood pressure) < 140/90 11/02/2016     Priority: Medium     Essential hypertension 04/08/2016     Priority: Medium      Benign essential hypertension 11/23/2015     Priority: Medium     Advanced directives, counseling/discussion 08/14/2014     Priority: Medium     Discussed Advance Directive planning with patient; information given to patient to review.           Cervical dysplasia      Priority: Medium     Anxiety      Priority: Medium     Irritable bowel disease      Priority: Medium     Carcinoid tumor      Priority: Medium     RT Lung Removed       CARDIOVASCULAR SCREENING; LDL GOAL LESS THAN 160 10/31/2010     Priority: Medium     HTN (hypertension)      Priority: Medium       Current Outpatient Medications   Medication     azithromycin (ZITHROMAX) 250 MG tablet     cetirizine HCl 10 MG CAPS     estradiol (ESTRACE) 0.1 MG/GM vaginal cream     FLUoxetine (PROZAC) 40 MG capsule     Multiple Vitamins-Minerals (WOMENS MULTI VITAMIN & MINERAL PO)     omeprazole 20 MG tablet     vitamin D3 (CHOLECALCIFEROL) 50 mcg (2000 units) tablet     No current facility-administered medications for this visit.         Objective    BP (!) 146/97   Pulse 75   Temp 97.4  F (36.3  C) (Tympanic)   Wt 78.7 kg (173 lb 6.4 oz)   SpO2 99%   BMI 35.02 kg/m    Physical Exam     Lung: left side without crackles/wheezes  GEN: NAD    Results for orders placed or performed in visit on 11/30/22   XR Chest 2 Views     Status: None    Narrative    CHEST TWO VIEWS  11/30/2022 2:28 PM     HISTORY: History of pneumonectomy right side. Rule out infection.  Acute cough.    COMPARISON: June 4, 2016       Impression    IMPRESSION: Similar largely opacified right hemithorax compared to  previous. Left lung clear. The cardiac silhouette is shifted, stable  since comparison. Pulmonary vasculature is unremarkable.     GARRET NOEL MD         SYSTEM ID:  [id number]             The use of Dragon/[employer] dictation services may have been used to construct the content in this note; any grammatical or spelling errors are non-intentional. Please contact the author of this note  directly if you are in need of any clarification.

## 2023-04-23 ENCOUNTER — HEALTH MAINTENANCE LETTER (OUTPATIENT)
Age: 63
End: 2023-04-23

## 2024-09-08 ENCOUNTER — HEALTH MAINTENANCE LETTER (OUTPATIENT)
Age: 64
End: 2024-09-08